# Patient Record
Sex: MALE | Race: BLACK OR AFRICAN AMERICAN | NOT HISPANIC OR LATINO | Employment: FULL TIME | URBAN - METROPOLITAN AREA
[De-identification: names, ages, dates, MRNs, and addresses within clinical notes are randomized per-mention and may not be internally consistent; named-entity substitution may affect disease eponyms.]

---

## 2019-02-22 ENCOUNTER — OFFICE VISIT (OUTPATIENT)
Dept: URGENT CARE | Facility: CLINIC | Age: 29
End: 2019-02-22

## 2019-02-22 ENCOUNTER — OCCUPATIONAL HEALTH (OUTPATIENT)
Dept: URGENT CARE | Facility: CLINIC | Age: 29
End: 2019-02-22

## 2019-02-22 VITALS
TEMPERATURE: 97 F | WEIGHT: 228 LBS | DIASTOLIC BLOOD PRESSURE: 88 MMHG | HEIGHT: 74 IN | RESPIRATION RATE: 16 BRPM | OXYGEN SATURATION: 98 % | HEART RATE: 56 BPM | SYSTOLIC BLOOD PRESSURE: 142 MMHG | BODY MASS INDEX: 29.26 KG/M2

## 2019-02-22 DIAGNOSIS — Z02.83 ENCOUNTER FOR DRUG SCREENING: ICD-10-CM
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
## 2019-02-22 DIAGNOSIS — Z02.1 PRE-EMPLOYMENT EXAMINATION: Primary | ICD-10-CM

## 2019-02-22 DIAGNOSIS — Z11.1 PPD SCREENING TEST: ICD-10-CM

## 2019-02-22 DIAGNOSIS — S51.859A HUMAN BITE OF FOREARM, INITIAL ENCOUNTER: Primary | ICD-10-CM

## 2019-02-22 LAB
BILIRUB UR QL STRIP: NEGATIVE
GLUCOSE UR QL STRIP: NEGATIVE
KETONES UR QL STRIP: NEGATIVE
LEUKOCYTE ESTERASE UR QL STRIP: NEGATIVE
PH, POC UA: 6 (ref 5–8)
POC BLOOD, URINE: NEGATIVE
POC NITRATES, URINE: NEGATIVE
PROT UR QL STRIP: NEGATIVE
SP GR UR STRIP: 1.01 (ref 1–1.03)
UROBILINOGEN UR STRIP-ACNC: NORMAL (ref 0.3–2.2)

## 2019-02-22 PROCEDURE — 99499 UNLISTED E&M SERVICE: CPT | Mod: S$GLB,,, | Performed by: FAMILY MEDICINE

## 2019-02-22 PROCEDURE — 99499 PHYSICAL, BASIC COMPLEXITY: ICD-10-PCS | Mod: S$GLB,,, | Performed by: FAMILY MEDICINE

## 2019-02-22 PROCEDURE — 99204 PR OFFICE/OUTPT VISIT, NEW, LEVL IV, 45-59 MIN: ICD-10-PCS | Mod: S$GLB,,, | Performed by: PHYSICIAN ASSISTANT

## 2019-02-22 PROCEDURE — 80305 OOH NON-DOT DRUG SCREEN: ICD-10-PCS | Mod: S$GLB,,, | Performed by: PHYSICIAN ASSISTANT

## 2019-02-22 PROCEDURE — 99204 OFFICE O/P NEW MOD 45 MIN: CPT | Mod: S$GLB,,, | Performed by: PHYSICIAN ASSISTANT

## 2019-02-22 PROCEDURE — 86580 TB INTRADERMAL TEST: CPT | Mod: S$GLB,,, | Performed by: FAMILY MEDICINE

## 2019-02-22 PROCEDURE — 86580 POCT TB SKIN TEST: ICD-10-PCS | Mod: S$GLB,,, | Performed by: FAMILY MEDICINE

## 2019-02-22 PROCEDURE — 80305 DRUG TEST PRSMV DIR OPT OBS: CPT | Mod: S$GLB,,, | Performed by: PHYSICIAN ASSISTANT

## 2019-02-22 RX ORDER — NAPROXEN 500 MG/1
500 TABLET ORAL 2 TIMES DAILY
COMMUNITY
End: 2021-11-18

## 2019-02-22 RX ORDER — AMOXICILLIN AND CLAVULANATE POTASSIUM 875; 125 MG/1; MG/1
1 TABLET, FILM COATED ORAL 2 TIMES DAILY
Qty: 28 TABLET | Refills: 0 | Status: SHIPPED | OUTPATIENT
Start: 2019-02-22 | End: 2019-03-08

## 2019-02-22 RX ORDER — BUTALBITAL, ACETAMINOPHEN AND CAFFEINE 50; 325; 40 MG/1; MG/1; MG/1
1 TABLET ORAL EVERY 4 HOURS PRN
COMMUNITY
End: 2021-11-18
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
## 2019-02-22 NOTE — PATIENT INSTRUCTIONS
Human Bite  The mouth has bacteria (germs) that can cause a very severe infection. If the tooth of another person has cut your skin, there is a chance of a serious infection developing within the first few days. Bites to the hand are especially prone to infection of the skin (such as cellulitis). Diseases (such as hepatitis B or C, or herpes simplex virus) may also be transmitted through human bites.  Human bite wounds may be either sutured closed or left open to heal depending on location, length of time since the bite, severity, signs of infection, and other concerns. Your doctor may want to do blood tests, a wound culture, X-ray, ultrasound, or others. Your doctor will explain if you need any of these and discuss your results.  Home care  The following will help you care for your wound at home:  1. Most skin wounds heal within 10 days. However, a human bite wound has a higher risk of getting infected. Look at the bite area each day for the next 4 days for signs of infection (listed below).  2. For certain types of wounds, an antibiotic will be prescribed. This will depend on several factors such as severity, surrounding structure injury, depth, location, and others. Take all antibiotics and medicines as directed until they are all gone.  3. If the bite is on the hand, arm, foot, or leg, limit the use of that extremity and keep it elevated for the first 24 hours.  4. You may be given a tetanus shot if needed.  5. Don't suck on the wound. This may introduce more bacteria.  Follow-up care  Follow up with your healthcare provider, or this facility as directed.  When to seek medical advice  Call your healthcare provider right away if you have any of these:  · Spreading redness  · Increased pain or swelling  · Fever of 100.4º F (38º C) or higher, or as directed by your healthcare provider  · Colored fluid or pus draining from the wound  · Any signs of nerve or tendon damage, such as inability to bend a joint or feel  an area of skin  Date Last Reviewed: 6/1/2016  © 7555-5207 [employer]. [address] [place] [address], [place] [address]. All rights reserved. This information is not intended as a substitute for professional medical care. Always follow your healthcare professional's instructions.       If not allergic,take tylenol (acetominophen) for fever control, chills, or body aches every 4 hours. Do not exceed 4000 mg/ day.If not allergic, take Motrin (Ibuprofen) every 4 hours for fever, chills, pain or inflammation. Do not exceed 2400 mg/day. You can alternate taking tylenol and motrin.  If you were prescribed a narcotic medication, do not drive or operate heavy equipment or machinery while taking these medications.  You must understand that you've received an Urgent Care treatment only and that you may be released before all your medical problems are known or treated. You, the patient, will arrange for follow up care as instructed.  Follow up with your PCP or specialty clinic as directed in the next 1-2 weeks if not improved or as needed.  You can call [phone] to schedule an appointment with the appropriate provider.  If your condition worsens we recommend that you receive another evaluation at the emergency room immediately or contact your primary medical clinics after hours call service to discuss your concerns.  Please return here or go to the Emergency Department for any concerns or worsening of condition.

## 2019-02-23 LAB
HBV SURFACE AB SER QL: NON REACTIVE
HCV AB S/CO SERPL IA: <0.1 S/CO RATIO (ref 0–0.9)
HIV 1+2 AB+HIV1 P24 AG SERPL QL IA: NON REACTIVE

## 2019-03-13 ENCOUNTER — TELEPHONE (OUTPATIENT)
Dept: URGENT CARE | Facility: CLINIC | Age: 29
End: 2019-03-13

## 2019-04-16 ENCOUNTER — TELEPHONE (OUTPATIENT)
Dept: URGENT CARE | Facility: CLINIC | Age: 29
End: 2019-04-16

## 2019-09-28 NOTE — TELEPHONE ENCOUNTER
----- Message from Mari Arguelles DO sent at 3/13/2019  8:28 AM CDT -----  Please notify patient that his test results were all normal or negative.  
Notify patient with his normal test result.  
Attending Only

## 2023-05-09 DIAGNOSIS — M25.561 PAIN IN RIGHT KNEE: Primary | ICD-10-CM

## 2023-05-18 ENCOUNTER — CLINICAL SUPPORT (OUTPATIENT)
Dept: REHABILITATION | Facility: HOSPITAL | Age: 33
End: 2023-05-18
Payer: OTHER MISCELLANEOUS

## 2023-05-18 DIAGNOSIS — R29.898 WEAKNESS OF RIGHT LOWER EXTREMITY: ICD-10-CM

## 2023-05-18 DIAGNOSIS — M25.561 PAIN IN RIGHT KNEE: ICD-10-CM

## 2023-05-18 DIAGNOSIS — R68.89 DECREASED FUNCTIONAL ACTIVITY TOLERANCE: ICD-10-CM

## 2023-05-18 PROCEDURE — 97110 THERAPEUTIC EXERCISES: CPT | Mod: PO | Performed by: PHYSICAL THERAPIST

## 2023-05-18 PROCEDURE — 97161 PT EVAL LOW COMPLEX 20 MIN: CPT | Mod: PO | Performed by: PHYSICAL THERAPIST
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
## 2023-05-31 ENCOUNTER — CLINICAL SUPPORT (OUTPATIENT)
Dept: REHABILITATION | Facility: HOSPITAL | Age: 33
End: 2023-05-31
Payer: OTHER MISCELLANEOUS

## 2023-05-31 DIAGNOSIS — R68.89 DECREASED FUNCTIONAL ACTIVITY TOLERANCE: Primary | ICD-10-CM

## 2023-05-31 DIAGNOSIS — R29.898 WEAKNESS OF RIGHT LOWER EXTREMITY: ICD-10-CM

## 2023-05-31 PROCEDURE — 97112 NEUROMUSCULAR REEDUCATION: CPT | Mod: PO,CQ

## 2023-05-31 PROCEDURE — 97110 THERAPEUTIC EXERCISES: CPT | Mod: PO,CQ

## 2023-05-31 NOTE — PROGRESS NOTES
[facility]   Workers' Compensation Physical Therapy Treatment Note      Name: José Miguel Motta  [facility] Number: [id number]    Therapy Diagnosis:   Encounter Diagnoses   Name Primary?    Decreased functional activity tolerance Yes    Weakness of right lower extremity      Physician: Trace Candelaria MD    Visit Date: 5/31/2023    Physician Orders: PT Eval and Treat   Post Surgical? No Eval and Treat Yes Type of Therapy Outpatient Therapy   Medical Diagnosis from Referral: M25.561 (ICD-10-CM) - Pain in right knee   Evaluation Date: 5/18/2023  Authorization Period Expiration: 05/08/2024   Plan of Care Expiration: 7/7/2023  Progress Note Due: 6/17/2023  Visit # / Visits authorized: 2/18  PTA: 1/5    (# of No Show Appts 0/Number of Cancelled Appts 1)    FOTO: 1/5  Time In: 1302  Time Out: 1405  Total Appointment Time: 55 minutes    Precautions: Standard    Prior Medical Treatment: He underwent conservative therapy x 3.5 mo prior to knee and toe surgery. He saw very minimal relief.  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  Current Work Restrictions: not working   Previous work status: Full duty, no knee, back, or foot pain prior to injury  Current work status: not working  Date last worked (if applicable): 11/18/2021    Subjective     Pt reports: his knee is a little sore after working on his ROM over the weekend. Patient states his knee is doing well today but he is feeling sore in the hip. He was compliant with home exercise program.  Response to previous treatment: no adverse effects   Function: too soon to determine    Pain: 4/10  Location: right hip(s) and right knee(s)     Objective      Objective Measures updated  at progress report unless specified.     Treatment     José Miguel received the treatments listed below:      therapeutic exercises to develop strength, endurance, ROM, flexibility, posture, and core stabilization for 40 minutes including:  Upright bike x 5 minutes  Standing gastroc stretch on incline x 1 minute x 2   Quad set + heel lift 2x10, 5 sec hold   SLR 2x10, 2#   S/L hip abduction 2x10, 2# (B)   Supine hip flexor stretch x 1 minute x 3     Lateral walking (green TB at thighs) x 30 ft x 3   BLE shuttle squats 2x10, 100#   SL shuttle squats x 10, 75#     neuromuscular re-education activities to improve: Balance, Coordination, Proprioception, and Posture for 15 minutes. The following activities were included:  Bridge 2x10 (cues for proper gluteal engagement)  Step ups (4 inch) 2x10  Standing TKE (green TB) 2x10    therapeutic activities to improve functional performance for 00  minutes, including:  Not today but will progress next/future treatment sessions    Patient Education and Home Exercises       Education provided:   - HEP compliance     Home Exercises Provided: Patient instructed to cont prior HEP. Exercises were reviewed and José Miguel was able to demonstrate them prior to the end of the session.  José Miguel demonstrated good  understanding of the education provided. See EMR under Patient Instructions for exercises provided during therapy sessions    Assessment     José Miguel demonstrating good quad activation but weakness is noted with SLR. Patient challenged by progression of gluteal focused exercises especially hip abductors. Cues for lateral gluteal recruitment as medial knee pain is noted with lateral walking. Good tolerance to step ups and standing TKE without complaints of pain but cues provided for proper LE alignment.     The patient's current job specific task deficits include the following: impaired standing tolerance and mobility.     José Miguel Is is making good progress towards meeting his goals.      Patient prognosis is: Good.   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    Pt's spiritual, cultural and educational needs considered and pt agreeable to plan of care and goals.     Anticipated barriers to physical therapy: chronicity of current injury, objective abilities vs. self-perceived abilities, and self-limiting behaviors due to pain    Goals:   Short Term Goals: 4 weeks   (1) patient will be independent with HOME EXERCISE PROGRAM. (initial, not met)      (2) patient will demo right knee AROM flexion to 135 deg for symmetry. (initial, not met)      (3) patient will demo bilateral hip and knee strength 5/5 for RTW. 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 (initial, not met)      (2) patient will demo FOTO impairment score 43% for improved QOL. 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 (initial, not met)    Plan     Continue current POC with emphasis on improving functional strength and ROM.     Keeley Terrell, PTA   5/31/2023

## 2023-06-02 ENCOUNTER — DOCUMENTATION ONLY (OUTPATIENT)
Dept: REHABILITATION | Facility: HOSPITAL | Age: 33
End: 2023-06-02

## 2023-06-05 ENCOUNTER — CLINICAL SUPPORT (OUTPATIENT)
Dept: REHABILITATION | Facility: HOSPITAL | Age: 33
End: 2023-06-05
Payer: OTHER MISCELLANEOUS

## 2023-06-05 DIAGNOSIS — R68.89 DECREASED FUNCTIONAL ACTIVITY TOLERANCE: Primary | ICD-10-CM

## 2023-06-05 DIAGNOSIS — R29.898 WEAKNESS OF RIGHT LOWER EXTREMITY: ICD-10-CM

## 2023-06-05 PROCEDURE — 97112 NEUROMUSCULAR REEDUCATION: CPT | Mod: PO | Performed by: PHYSICAL THERAPIST

## 2023-06-05 PROCEDURE — 97140 MANUAL THERAPY 1/> REGIONS: CPT | Mod: PO | Performed by: PHYSICAL THERAPIST

## 2023-06-05 PROCEDURE — 97110 THERAPEUTIC EXERCISES: CPT | Mod: PO | Performed by: PHYSICAL THERAPIST

## 2023-06-05 NOTE — PROGRESS NOTES
[facility]   Workers' Compensation Physical Therapy Treatment Note      Name: José Miguel Motta  Clinic Number: [id number]    Therapy Diagnosis:   Encounter Diagnoses   Name Primary?    Decreased functional activity tolerance Yes    Weakness of right lower extremity      Physician: Trace Candelaria MD    Visit Date: 6/5/2023    Physician Orders: PT Eval and Treat   Post Surgical? No Eval and Treat Yes Type of Therapy Outpatient Therapy   Medical Diagnosis from Referral: M25.561 (ICD-10-CM) - Pain in right knee   Evaluation Date: 5/18/2023  Authorization Period Expiration: 05/08/2024   Plan of Care Expiration: 7/7/2023  Progress Note Due: 6/17/2023  Visit # / Visits authorized: 3/18  PTA: 0/5    (# of No Show Appts 0/Number of Cancelled Appts 1)    FOTO: 1/5  Time In: 1306  Time Out: 1400  Total Appointment Time: 55 minutes    Precautions: Standard    Prior Medical Treatment: He underwent conservative therapy x 3.5 mo prior to knee and toe surgery. He saw very minimal relief.  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 His pain today is a 3/10. He performed home program last on Saturday.  He was compliant with home exercise program.  Response to previous treatment: no adverse effects   Function: too soon to determine    Pain: 3/10  Location: right hip(s) and right knee(s)     Objective      Objective Measures updated at progress report unless  "specified.     Treatment     José Miguel received the treatments listed below:      therapeutic exercises to develop strength, endurance, ROM, flexibility, posture, and core stabilization for 35 minutes including:  Upright bike x 5 minutes  Standing gastroc stretch on incline x 1 minute x 2 (Gastroc and Soleus)  Quad set + heel lift 2x10, 5 sec hold   SLR 2x10, 2#   S/L hip abduction 2x10, 2# (B) (cues given for tech)  Supine hip flexor stretch x 1 minute x 3 -np  Right knee AROM flexion: 135 deg (Dec pain after MT)  QS with internal tibial rot and self OP 10 x 3"  Self inf mobs x 5 (+ to HEP x 10 each day)  Right LE strap up 6" with Left knee drive x 10    Not performed:  Lateral walking (green TB at thighs) x 30 ft x 3   BLE shuttle squats 2x10, 100#   SL shuttle squats x 10, 75#     Manual therapy x 10 mins:  Fat pad mobs  Tibial INTERNAL ROTATION mobs  Inferior patellar mobs grade III-IV    neuromuscular re-education activities to improve: Balance, Coordination, Proprioception, and Posture for 15 minutes. The following activities were included:  Bridge 2x10 (cues for proper gluteal engagement)  Step ups (4 inch) 2 x 10  Heel tap 2" x 10 (attempted and discontinued)  Standing TKE (green TB) 2 x 15    therapeutic activities to improve functional performance for 00  minutes, including:  Not today but will progress next/future treatment sessions    Patient Education and Home Exercises       Education provided:   - HEP compliance   - + inf patella self mobs x 10 a day    Home Exercises Provided: Patient instructed to cont prior HEP. Exercises were reviewed and José Miguel was able to demonstrate them prior to the end of the session.  José Miguel demonstrated good  understanding of the education provided. See EMR under Patient Instructions for exercises provided during therapy sessions    Assessment     José Miguel demonstrating good quad activation and improved post right knee mobs as above. Patient challenged by progression of " "gluteal focused exercises especially hip abductors. Pt reported pn to be much less following manual therapy and home education. Pt with tendency to kick in right TFL and hip flexors during side lying hip Abduction. Pt able to correct quickly with cues. May use a ball with lateral walks to see if this decreases knee pn. Pt with med knee pain during attempted progression to heel tap on 2". Continue improving function and mobility.    The patient's current job specific task deficits include the following: impaired standing tolerance and mobility.     José Miguel Luz is making good progress towards meeting his goals.     Patient prognosis is: Good.   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    Pt's spiritual, cultural and educational needs considered and pt agreeable to plan of care and goals.     Anticipated barriers to physical therapy: chronicity of current injury, objective abilities vs. self-perceived abilities, and self-limiting behaviors due to pain    Goals:   Short Term Goals: 4 weeks   (1) patient will be independent with HOME EXERCISE PROGRAM. (initial, not met)    -ongoing     (2) patient will demo right knee AROM flexion to 135 deg for symmetry. (initial, not met)      (3) patient will demo bilateral hip and knee strength 5/5 for RTW. 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 (initial, not met)      (2) patient will demo FOTO impairment score 43% for improved QOL. 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 (initial, not met)    Plan     Continue current POC with emphasis on " improving functional strength and ROM.     Edie Lerma, PT   6/5/2023

## 2023-06-07 ENCOUNTER — CLINICAL SUPPORT (OUTPATIENT)
Dept: REHABILITATION | Facility: HOSPITAL | Age: 33
End: 2023-06-07
Payer: OTHER MISCELLANEOUS

## 2023-06-07 DIAGNOSIS — R29.898 WEAKNESS OF RIGHT LOWER EXTREMITY: ICD-10-CM

## 2023-06-07 DIAGNOSIS — R68.89 DECREASED FUNCTIONAL ACTIVITY TOLERANCE: Primary | ICD-10-CM

## 2023-06-07 PROCEDURE — 97110 THERAPEUTIC EXERCISES: CPT | Mod: PO,CQ

## 2023-06-07 PROCEDURE — 97140 MANUAL THERAPY 1/> REGIONS: CPT | Mod: PO,CQ

## 2023-06-07 PROCEDURE — 97112 NEUROMUSCULAR REEDUCATION: CPT | Mod: PO,CQ

## 2023-06-07 NOTE — PROGRESS NOTES
[facility]   Workers' Compensation Physical Therapy Treatment Note      Name: José Miguel Motta  Clinic Number: [id number]    Therapy Diagnosis:   Encounter Diagnoses   Name Primary?    Decreased functional activity tolerance Yes    Weakness of right lower extremity      Physician: Trace Candelaria MD    Visit Date: 6/7/2023    Physician Orders: PT Eval and Treat   Post Surgical? No Eval and Treat Yes Type of Therapy Outpatient Therapy   Medical Diagnosis from Referral: M25.561 (ICD-10-CM) - Pain in right knee   Evaluation Date: 5/18/2023  Authorization Period Expiration: 05/08/2024   Plan of Care Expiration: 7/7/2023  Progress Note Due: 6/17/2023  Visit # / Visits authorized: 4/18  PTA: 0/5    (# of No Show Appts 0/Number of Cancelled Appts 1)    FOTO: 1/5    Time In: 1305  Time Out: 1400  Total Appointment Time: 55 minutes    Precautions: Standard    Prior Medical Treatment: He underwent conservative therapy x 3.5 mo prior to knee and toe surgery. He saw very minimal relief.  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 His pain today is a 3/10. He performed home program last on Saturday.  He was compliant with home exercise program.  Response to previous treatment: no adverse effects   Function: too soon to determine    Pain: 3/10  Location: right hip(s) and right knee(s)     Objective      Objective Measures updated at progress report unless  "specified.     Treatment     José Miguel received the treatments listed below:      therapeutic exercises to develop strength, endurance, ROM, flexibility, posture, and core stabilization for 35 minutes including:  Upright bike x 5 minutes  Standing gastroc stretch on incline x 1 minute x 2 (Gastroc and Soleus)  Quad set + heel lift 2x10, 5 sec hold   SLR 2x10, 2#   S/L hip abduction 2x10, 2# (B) (cues given for tech)  Supine hip flexor stretch x 1 minute x 3 -np    BLE shuttle squats 3x10, 100#  SL shuttle squats 2x10, 62.5#     Lateral walking (green TB at thighs) x 30 ft x 3 (straight knees, no mini squat due to pain)    Manual therapy x 10 mins:  Fat pad mobs  Tibial INTERNAL ROTATION mobs  Inferior patellar mobs grade III-IV    neuromuscular re-education activities to improve: Balance, Coordination, Proprioception, and Posture for 15 minutes. The following activities were included:  Bridge 2x10 (cues for proper gluteal engagement)  Standing TKE (ball against wall) 2x10, 3 sec hold   Right LE step up 6" with Left knee drive x 10  Heel tap 2" x 10 (attempted and discontinued)    therapeutic activities to improve functional performance for 00  minutes, including:  Not today but will progress next/future treatment sessions    Patient Education and Home Exercises       Education provided:   - HEP compliance   - + inf patella self mobs x 10 a day    Home Exercises Provided: Patient instructed to cont prior HEP. Exercises were reviewed and José Miguel was able to demonstrate them prior to the end of the session.  José Miguel demonstrated good  understanding of the education provided. See EMR under Patient Instructions for exercises provided during therapy sessions    Assessment     José Miguel demonstrating improved awareness of isolated quad activation without compensation. Good tolerance to shuttle squats today demonstrating ability to appropriately activate hip abductors. No complaints of pain Patient responded well to manual " therapy techniques to improve end range extension and overall joint mechanics. Continue improving function and mobility.    The patient's current job specific task deficits include the following: impaired standing tolerance and mobility.     José Miguel Luz is making good progress towards meeting his goals.     Patient prognosis is: Good.   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    Pt's spiritual, cultural and educational needs considered and pt agreeable to plan of care and goals.     Anticipated barriers to physical therapy: chronicity of current injury, objective abilities vs. self-perceived abilities, and self-limiting behaviors due to pain    Goals:   Short Term Goals: 4 weeks   (1) patient will be independent with HOME EXERCISE PROGRAM. (initial, not met)    -ongoing     (2) patient will demo right knee AROM flexion to 135 deg for symmetry. (initial, not met)      (3) patient will demo bilateral hip and knee strength 5/5 for RTW. 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 (initial, not met)      (2) patient will demo FOTO impairment score 43% for improved QOL. 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 (initial, not met)    Plan     Continue current POC with emphasis on improving functional strength and ROM.     Keeley Terrell, PTA   6/7/2023

## 2023-06-12 ENCOUNTER — CLINICAL SUPPORT (OUTPATIENT)
Dept: REHABILITATION | Facility: HOSPITAL | Age: 33
End: 2023-06-12
Payer: OTHER MISCELLANEOUS

## 2023-06-12 DIAGNOSIS — R29.898 WEAKNESS OF RIGHT LOWER EXTREMITY: ICD-10-CM

## 2023-06-12 DIAGNOSIS — R68.89 DECREASED FUNCTIONAL ACTIVITY TOLERANCE: Primary | ICD-10-CM

## 2023-06-12 PROCEDURE — 97112 NEUROMUSCULAR REEDUCATION: CPT | Mod: PO,CQ

## 2023-06-12 PROCEDURE — 97110 THERAPEUTIC EXERCISES: CPT | Mod: PO,CQ

## 2023-06-12 NOTE — PROGRESS NOTES
[facility]   Workers' Compensation Physical Therapy Treatment Note      Name: José Miguel Motta  Clinic Number: [id number]    Therapy Diagnosis:   Encounter Diagnoses   Name Primary?    Decreased functional activity tolerance Yes    Weakness of right lower extremity      Physician: Trace Candelaria MD    Visit Date: 6/12/2023    Physician Orders: PT Eval and Treat   Post Surgical? No Eval and Treat Yes Type of Therapy Outpatient Therapy   Medical Diagnosis from Referral: M25.561 (ICD-10-CM) - Pain in right knee   Evaluation Date: 5/18/2023  Authorization Period Expiration: 05/08/2024   Plan of Care Expiration: 7/7/2023  Progress Note Due: 6/17/2023  Visit # / Visits authorized: 5/18  PTA: 1/5    (# of No Show Appts 0/Number of Cancelled Appts 1)    FOTO: 1/5    Time In: 1305  Time Out: 1405  Total Appointment Time: 40 minutes    Precautions: Standard    Prior Medical Treatment: He underwent conservative therapy x 3.5 mo prior to knee and toe surgery. He saw very minimal relief.  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 His pain today is a 3/10. He performed home program last on Saturday.  He was compliant with home exercise program.  Response to previous treatment: no adverse effects   Function: too soon to determine    Pain: 3/10  Location: right hip(s) and right knee(s)     Objective      Objective Measures updated at progress report unless  "specified.     Treatment     José Miguel received the treatments listed below:      therapeutic exercises to develop strength, endurance, ROM, flexibility, posture, and core stabilization for 35 minutes including:  Upright bike x 5 minutes  Standing gastroc stretch on incline x 1 minute x 2 (Gastroc and Soleus)  Quad set + heel lift 2x10, 5 sec hold   SLR 2x10, 2#   S/L hip abduction 2x10, 2# (B) (cues given for tech)  Supine hip flexor stretch x 1 minute x 3 -np  Seated precor: knee extension eccentric: 2 up, 1 down (R), 2x10, 15#   BLE shuttle squats 3x10, 100#  SL shuttle squats 2x10, 62.5#   Lateral walking (green TB at thighs) x 30 ft x 3 (straight knees, no mini squat due to pain)    Manual therapy x 00 mins:  Fat pad mobs  Tibial INTERNAL ROTATION mobs  Inferior patellar mobs grade III-IV    neuromuscular re-education activities to improve: Balance, Coordination, Proprioception, and Posture for 15 minutes. The following activities were included:  Bridge 2x10 (cues for proper gluteal engagement)  Standing TKE (ball against wall) 2x10, 3 sec hold   Right LE step up 6" with Left knee drive x 10  Heel tap 2" x 10 (attempted and discontinued)    therapeutic activities to improve functional performance for 00  minutes, including:  Not today but will progress next/future treatment sessions    Patient Education and Home Exercises       Education provided:   - HEP compliance   - + inf patella self mobs x 10 a day    Home Exercises Provided: Patient instructed to cont prior HEP. Exercises were reviewed and José Miguel was able to demonstrate them prior to the end of the session.  José Miguel demonstrated good  understanding of the education provided. See EMR under Patient Instructions for exercises provided during therapy sessions    Assessment     Progressed eccentric focused activities today without adverse effect but weakness evident. Patient continues with medial knee pain with lateral walking that resolves when he is taken " out of mini squat position. Quad strength consistently progressing.     The patient's current job specific task deficits include the following: impaired standing tolerance and mobility.     José Miguel Luz is making good progress towards meeting his goals.     Patient prognosis is: Good.   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    Pt's spiritual, cultural and educational needs considered and pt agreeable to plan of care and goals.     Anticipated barriers to physical therapy: chronicity of current injury, objective abilities vs. self-perceived abilities, and self-limiting behaviors due to pain    Goals:   Short Term Goals: 4 weeks   (1) patient will be independent with HOME EXERCISE PROGRAM. (initial, not met)    -ongoing     (2) patient will demo right knee AROM flexion to 135 deg for symmetry. (initial, not met)      (3) patient will demo bilateral hip and knee strength 5/5 for RTW. 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 (initial, not met)      (2) patient will demo FOTO impairment score 43% for improved QOL. 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 (initial, not met)    Plan     Continue current POC with emphasis on improving functional strength and ROM.     Keeley Terrell, PTA   6/12/2023

## 2023-06-14 ENCOUNTER — CLINICAL SUPPORT (OUTPATIENT)
Dept: REHABILITATION | Facility: HOSPITAL | Age: 33
End: 2023-06-14
Payer: OTHER MISCELLANEOUS

## 2023-06-14 DIAGNOSIS — R29.898 WEAKNESS OF RIGHT LOWER EXTREMITY: ICD-10-CM

## 2023-06-14 DIAGNOSIS — R68.89 DECREASED FUNCTIONAL ACTIVITY TOLERANCE: Primary | ICD-10-CM

## 2023-06-14 PROCEDURE — 97530 THERAPEUTIC ACTIVITIES: CPT | Mod: PO,CQ

## 2023-06-14 PROCEDURE — 97110 THERAPEUTIC EXERCISES: CPT | Mod: PO,CQ

## 2023-06-14 PROCEDURE — 97112 NEUROMUSCULAR REEDUCATION: CPT | Mod: PO,CQ

## 2023-06-14 NOTE — PROGRESS NOTES
[facility]   Workers' Compensation Physical Therapy Treatment Note      Name: José Miguel Motta  [facility] Number: [id number]    Therapy Diagnosis:   Encounter Diagnoses   Name Primary?    Decreased functional activity tolerance Yes    Weakness of right lower extremity        Physician: Trace Candelaria MD    Visit Date: 6/14/2023    Physician Orders: PT Eval and Treat   Post Surgical? No Eval and Treat Yes Type of Therapy Outpatient Therapy   Medical Diagnosis from Referral: M25.561 (ICD-10-CM) - Pain in right knee   Evaluation Date: 5/18/2023  Authorization Period Expiration: 05/08/2024   Plan of Care Expiration: 7/7/2023  Progress Note Due: 6/17/2023  Visit # / Visits authorized: 6/18  PTA: 2/5    (# of No Show Appts 0/Number of Cancelled Appts 1)    FOTO: 1/5    Time In: 1304  Time Out: 1400  Total Appointment Time: 53 minutes    Precautions: Standard    Prior Medical Treatment: He underwent conservative therapy x 3.5 mo prior to knee and toe surgery. He saw very minimal relief.  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 Patient reports soreness into thighs and knee following last treatment session. Patient states he does have intermittent complaints of knee soreness throughout the day but he works through this. He was compliant with home exercise program.  Response to previous treatment: no adverse effects   Function: too soon to determine    Pain: 3/10  Location:  right hip(s) and right knee(s)     Objective      Objective Measures updated at progress report unless specified.     Treatment     José Miguel received the treatments listed below:      therapeutic exercises to develop strength, endurance, ROM, flexibility, posture, and core stabilization for 35 minutes including:  Upright bike x 7 minutes, patient sets resistance  Standing gastroc stretch on incline x 1 minute x 2 (Gastroc and Soleus)  Quad set + heel lift 2x10, 5 sec hold   SLR 3x10, 2#   S/L hip abduction 3x10, 2# (B)   Seated precor: knee extension eccentric: 2 up, 1 down (R), 2x10, 20#   BLE shuttle squats 3x10, 100#  SL shuttle squats 2x10, 62.5#   Lateral walking (green TB at thighs) x 30 ft x 3 (straight knees, no mini squat due to pain)    Manual therapy x 00 mins:  Fat pad mobs  Tibial INTERNAL ROTATION mobs  Inferior patellar mobs grade III-IV    neuromuscular re-education activities to improve: Balance, Coordination, Proprioception, and Posture for 15 minutes. The following activities were included:  Bridge 2x10 (green TB, cues for proper gluteal engagement)  Standing TKE (ball against wall) 2x10, 3 sec hold     therapeutic activities to improve functional performance for 10 minutes, including:  Sit to stand from standard chair to mimic squat 2x10   Sit to stand from standard chair + 10# med ball 2x10  Step up (12 inch step) + knee drive    Patient Education and Home Exercises       Education provided:   - HEP compliance   - + inf patella self mobs x 10 a day    Home Exercises Provided: Patient instructed to cont prior HEP. Exercises were reviewed and José Miguel was able to demonstrate them prior to the end of the session.  José Miguel demonstrated good  understanding of the education provided. See EMR under Patient Instructions for exercises provided during therapy sessions    Assessment     José Miguel able to progress to squatting activities without complaints of pain but visual feedback from mirror required to  achieve proper form and weightbearing. Patient fatigued by progression of activities today and he is anxious to return to normal activities/workout routine    The patient's current job specific task deficits include the following: impaired standing tolerance and mobility.     José Miguel Luz is making good progress towards meeting his goals.     Patient prognosis is: Good.   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    Pt's spiritual, cultural and educational needs considered and pt agreeable to plan of care and goals.     Anticipated barriers to physical therapy: chronicity of current injury, objective abilities vs. self-perceived abilities, and self-limiting behaviors due to pain    Goals:   Short Term Goals: 4 weeks   (1) patient will be independent with HOME EXERCISE PROGRAM. (initial, not met)    -ongoing     (2) patient will demo right knee AROM flexion to 135 deg for symmetry. (initial, not met)      (3) patient will demo bilateral hip and knee strength 5/5 for RTW. 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 (initial, not met)      (2) patient will demo FOTO impairment score 43% for improved QOL. 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 (initial, not met)    Plan     Continue current POC with emphasis on improving functional strength and ROM.     Keeley Terrell, PTA   6/14/2023

## 2023-06-16 ENCOUNTER — CLINICAL SUPPORT (OUTPATIENT)
Dept: REHABILITATION | Facility: HOSPITAL | Age: 33
End: 2023-06-16
Payer: OTHER MISCELLANEOUS

## 2023-06-16 DIAGNOSIS — R68.89 DECREASED FUNCTIONAL ACTIVITY TOLERANCE: Primary | ICD-10-CM

## 2023-06-16 DIAGNOSIS — R29.898 WEAKNESS OF RIGHT LOWER EXTREMITY: ICD-10-CM

## 2023-06-16 PROCEDURE — 97140 MANUAL THERAPY 1/> REGIONS: CPT | Mod: PO | Performed by: PHYSICAL THERAPIST

## 2023-06-16 PROCEDURE — 97110 THERAPEUTIC EXERCISES: CPT | Mod: PO | Performed by: PHYSICAL THERAPIST

## 2023-06-16 PROCEDURE — 97530 THERAPEUTIC ACTIVITIES: CPT | Mod: PO | Performed by: PHYSICAL THERAPIST

## 2023-06-16 NOTE — PROGRESS NOTES
"[facility]   Reassessment: Workers' Compensation Physical Therapy Treatment Note      Name: José Miguel Motta  Clinic Number: [id number]    Therapy Diagnosis:   Encounter Diagnoses   Name Primary?    Decreased functional activity tolerance Yes    Weakness of right lower extremity      Physician: Trace Candelaria MD    Visit Date: 6/16/2023    Physician Orders: PT Eval and Treat   Post Surgical? No Eval and Treat Yes Type of Therapy Outpatient Therapy   Medical Diagnosis from Referral: M25.561 (ICD-10-CM) - Pain in right knee   Evaluation Date: 5/18/2023  Authorization Period Expiration: 05/08/2024   Plan of Care Expiration: 7/7/2023  Progress Note Due: 6/17/2023  Visit # / Visits authorized: 7/18  PTA: 0/5    (# of No Show Appts 0/Number of Cancelled Appts 1)    FOTO: 2/5    Time In: 900 (in restroom)  Time Out: 1005  Total Appointment Time: 54 minutes    Precautions: Standard    Prior Medical Treatment: He underwent conservative therapy x 3.5 mo prior to knee and toe surgery. He saw very minimal relief.  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 Right knee at times is "shooting" pain. Seeshira Talavera end of month He was compliant with home exercise program.  Response to previous treatment: no adverse effects   Function: too soon to determine    Pain: 3/10 knee; 4/10 hip  Location: right hip(s) and right knee(s)     His center back hurts.    Objective      Objective Measures updated at " progress report unless specified.   Observations: Antalgic gait pattern is not present. However, decreased right knee TKE at heel strike is present. (QUAD WEAKNESS LIKELY GIVEN FULL KNEE EXTENSION)  Palpation: pt tenderness to palpation to med and lat joint line. Swelling present? yes     Knee Right     Left     Comment as applicable    (Normal, in degrees) AROM PROM MMT AROM PROM MMT     Flexion (135)  137 + 4-/5 135 - 5/5 + pn w/ over pressure flex   Extension (0-5) 0 - 4/5 0 - 5/5        Ankle strength: bilateral grossly 5/5.     Hip strength:  Right/Left:  Hip flexion: 4+/5; 5/5  Hip Abduction: 4+/5; 5/5  Hip adduction: 4/5; 5/5     Muscle length ((+): Positive, (-): Negative):   - Hamstring 90/90 = (+)  - ITB Geoffrey's testing = (+) (lateral patella tilt bilateral knee)     Ligamentous Special testing ((+): Positive, (-): Negative):   - MCL: Valgus stress test = (-)  - LCL: Varus stress test = (-)  - ACL: Anterior drawer = (-), Lachman's = (-)  - PCL: tibial plateau sag sign = (-), Posterior Drawer = (-)     Meniscus testing: Cluster exam for meniscal injury: Met 2/5  - (neg) Hx joint locking, (+) Joint line tenderness, (-) Paul, (+) Pain with overpressure flexion, (-) Pain with overpressure extension:      Cluster exam for knee OA: Cluster exam for knee OA: Met 1/5  - (neg) Age > 49 y/o, stiffness > 30', (neg) crepitus, (neg) bony tenderness, (pos) no palpable warmth     Functional Screening: Pain with the following? 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 Mobility skills Sweeping, walking 30 mins around home    3. N/A          Limitation/Restriction for  "FOTO KNEE Survey     Therapist reviewed FOTO scores   FOTO documents entered into [facility] - see Media section.     Limitation Score: 59%  Predicted Score: 43%  6/16/2023: 54%      Treatment     José Miguel received the treatments listed below:      therapeutic exercises to develop strength, endurance, ROM, flexibility, posture, and core stabilization for 42 minutes including:  Upright bike x 7 minutes, patient sets resistance-np d/t late start  Standing gastroc stretch on incline x 1 minute x 2 (Gastroc and Soleus)-np  Quad set + heel lift 2x10, 5 sec hold   Side lying hip adduction 3 # 2 x 10, 3#  SLR 3x10, 3#   S/L hip abduction 3x10, 3# (B)   SINGLE LEG bridge 2 x 10, 3"  Reassessment.    Not performed:  Seated precor: knee extension eccentric: 2 up, 1 down (R), 2x10, 20#   BLE shuttle squats 3x10, 100#  SL shuttle squats 2x10, 62.5#   Lateral walking (green TB at thighs) x 30 ft x 3 (straight knees, no mini squat due to pain)    Manual therapy x 08 mins:  Fat pad mobs  Scar massage  Inferior patellar mobs grade III-IV    neuromuscular re-education activities to improve: Balance, Coordination, Proprioception, and Posture for 00 minutes. The following activities were included:  Bridge 2x10 (green TB, cues for proper gluteal engagement)  Standing TKE (ball against wall) 2x10, 3 sec hold     therapeutic activities to improve functional performance for 10 minutes, including:  Chair squats ball at knees x 10  Lat walk with ball 20 feet x 3 laps    Not performed:  Sit to stand from standard chair to mimic squat 2x10   Sit to stand from standard chair + 10# med ball 2x10  Step up (12 inch step) + knee drive    Patient Education and Home Exercises       Education provided:   - HEP compliance   - knuckle scar massage in sitting to knee    Home Exercises Provided: Patient instructed to cont prior HEP. Exercises were reviewed and José Miguel was able to demonstrate them prior to the end of the session.  José Miguel demonstrated good  " understanding of the education provided. See EMR under Patient Instructions for exercises provided during therapy sessions    Assessment     José Miguel comes in comfortable sliders. He was instructed to come in tennis shoes as we will be performing more squatting and things towards end goal of returning to some sort of working condition. He has scar tissue, reduce fat pad mobility, and swelling inf right patella causing him pain. Manual therapy performed to reduce this. Weakness in right hip adduction, added side lying adduction and lat walks with ball. Improving strength of right hip Abduction but still weak. He is able to progress to squatting activities without complaints of pain but visual feedback from mirror required to achieve proper form and weightbearing. Ball for VMO and adductors used with chair squats. Patient fatigued by progression of activities today, and he is anxious to return to normal activities/workout routine.    The patient's current job specific task deficits include the following: impaired standing tolerance and mobility.     José Miguel is making good progress towards meeting his goals.     Patient prognosis is: Good.   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    Pt's spiritual, cultural and educational needs considered and pt agreeable to plan of care and goals.     Anticipated barriers to physical therapy: chronicity of current injury, objective abilities vs. self-perceived abilities, and self-limiting behaviors due to pain    Goals:   Short Term Goals: 4 weeks   (1) patient will be independent with HOME EXERCISE PROGRAM.   -MET, 6/16/2023.     (2) patient will demo right knee AROM  flexion to 135 deg for symmetry.    -MET, 6/16/2023, 137 DEG.     (3) patient will demo bilateral hip and knee strength 5/5 for RTW. 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 (progressing)      (2) patient will demo FOTO impairment score 43% for improved QOL. 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 (progressing, not met)    Plan     Continue current POC with emphasis on improving functional strength and ROM.     Edie Lerma, PT   6/16/2023

## 2023-06-19 ENCOUNTER — CLINICAL SUPPORT (OUTPATIENT)
Dept: REHABILITATION | Facility: HOSPITAL | Age: 33
End: 2023-06-19
Payer: OTHER MISCELLANEOUS

## 2023-06-19 DIAGNOSIS — R29.898 WEAKNESS OF RIGHT LOWER EXTREMITY: ICD-10-CM

## 2023-06-19 DIAGNOSIS — R68.89 DECREASED FUNCTIONAL ACTIVITY TOLERANCE: Primary | ICD-10-CM

## 2023-06-19 PROCEDURE — 97530 THERAPEUTIC ACTIVITIES: CPT | Mod: PO

## 2023-06-19 PROCEDURE — 97110 THERAPEUTIC EXERCISES: CPT | Mod: PO

## 2023-06-19 PROCEDURE — 97140 MANUAL THERAPY 1/> REGIONS: CPT | Mod: PO

## 2023-06-19 PROCEDURE — 97112 NEUROMUSCULAR REEDUCATION: CPT | Mod: PO

## 2023-06-19 NOTE — PROGRESS NOTES
[facility]   Reassessment: Workers' Compensation Physical Therapy Treatment Note      Name: José Miguel Motta  [facility] Number: [id number]    Therapy Diagnosis:   Encounter Diagnoses   Name Primary?    Decreased functional activity tolerance Yes    Weakness of right lower extremity      Physician: Trace Candelaria MD    Visit Date: 6/19/2023    Physician Orders: PT Eval and Treat   Post Surgical? No Eval and Treat Yes Type of Therapy Outpatient Therapy   Medical Diagnosis from Referral: M25.561 (ICD-10-CM) - Pain in right knee   Evaluation Date: 5/18/2023  Authorization Period Expiration: 05/08/2024   Plan of Care Expiration: 7/7/2023  Progress Note Due: 6/17/2023  Visit # / Visits authorized: 7/18  PTA: 0/5    (# of No Show Appts 0/Number of Cancelled Appts 1)    FOTO: 2/5    Time In: 1100  Time Out: 1200  Total Appointment Time: 60 minutes    Precautions: Standard    Prior Medical Treatment: He underwent conservative therapy x 3.5 mo prior to knee and toe surgery. He saw very minimal relief.  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  Response to previous treatment: no adverse effects   Function: too soon to determine    Pain: 3/10 knee; 4/10 hip  Location: right hip(s) and right knee(s)     His center back hurts.    Objective      Objective Measures updated at progress report unless specified.   Observations:  Antalgic gait pattern is not present. However, decreased right knee TKE at heel strike is present. (QUAD WEAKNESS LIKELY GIVEN FULL KNEE EXTENSION)  Palpation: pt tenderness to palpation to med and lat joint line. Swelling present? yes     Knee Right     Left     Comment as applicable    (Normal, in degrees) AROM PROM MMT AROM PROM MMT     Flexion (135)  137 + 4-/5 135 - 5/5 + pn w/ over pressure flex   Extension (0-5) 0 - 4/5 0 - 5/5        Ankle strength: bilateral grossly 5/5.     Hip strength:  Right/Left:  Hip flexion: 4+/5; 5/5  Hip Abduction: 4+/5; 5/5  Hip adduction: 4/5; 5/5     Muscle length ((+): Positive, (-): Negative):   - Hamstring 90/90 = (+)  - ITB Geoffrey's testing = (+) (lateral patella tilt bilateral knee)     Ligamentous Special testing ((+): Positive, (-): Negative):   - MCL: Valgus stress test = (-)  - LCL: Varus stress test = (-)  - ACL: Anterior drawer = (-), Lachman's = (-)  - PCL: tibial plateau sag sign = (-), Posterior Drawer = (-)     Meniscus testing: Cluster exam for meniscal injury: Met 2/5  - (neg) Hx joint locking, (+) Joint line tenderness, (-) Paul, (+) Pain with overpressure flexion, (-) Pain with overpressure extension:      Cluster exam for knee OA: Cluster exam for knee OA: Met 1/5  - (neg) Age > 49 y/o, stiffness > 30', (neg) crepitus, (neg) bony tenderness, (pos) no palpable warmth     Functional Screening: Pain with the following? (Y = yes, N = no), Comments if applicable  - Squatting: Y  - Lunging: Y  - Kneeling: Y  - Walking on heels: N  - Walking on toes: Y, right toe  - Single leg Balance: N  - Thessaly Meniscal Test: N     [place] Knee Rules: X-Ray Indication (neg)     Functional Job Specific Testing: [employer]  Job Specific Task Job Demands Current Ability   1. 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 Mobility skills Sweeping, walking 30 mins around home    3. N/A          Limitation/Restriction for FOTO KNEE Survey     Therapist reviewed FOTO scores  "  FOTO documents entered into [facility] - see Media section.     Limitation Score: 59%  Predicted Score: 43%  6/16/2023: 54%      Treatment     José Miguel received the treatments listed below:      therapeutic exercises to develop strength, endurance, ROM, flexibility, posture, and core stabilization for 15 minutes including:  Upright bike x 7 minutes, patient sets resistance-np d/t late start  Standing gastroc stretch on incline x 1 minute x 2 (Gastroc and Soleus)-np  Quad set + heel lift 2x10, 5 sec hold       Not performed:  Side lying hip adduction 3 # 2 x 10, 3#  SLR 3x10, 3#   S/L hip abduction 3x10, 3# (B)   SINGLE LEG bridge 2 x 10, 3"  Reassessment.  Seated precor: knee extension eccentric: 2 up, 1 down (R), 2x10, 20#   BLE shuttle squats 3x10, 100#  SL shuttle squats 2x10, 62.5#   Lateral walking (green TB at thighs) x 30 ft x 3 (straight knees, no mini squat due to pain)    Manual therapy x 10 mins:  Fat pad mobs  Scar massage  Inferior patellar mobs grade III-IV    neuromuscular re-education activities to improve: Balance, Coordination, Proprioception, and Posture for 20 minutes. The following activities were included:  Bridge 2x10 (green TB, cues for proper gluteal engagement)  Standing TKE (ball against wall) 2x10, 3 sec hold   Clams with manual resistance 4x15s bilateral   Quad Iso on knee ext machine 5x15s      therapeutic activities to improve functional performance for 15 minutes, including:  Sit to stand from standard chair + 15# med ball 3x8  Step up (12 inch step) + knee drive 20# KB 3x8    Not performed:  Lat walk with ball 20 feet x 3 laps      Patient Education and Home Exercises       Education provided:   - HEP compliance   - knuckle scar massage in sitting to knee    Home Exercises Provided: Patient instructed to cont prior HEP. Exercises were reviewed and José Miguel was able to demonstrate them prior to the end of the session.  José Miguel demonstrated good  understanding of the education provided. " See EMR under Patient Instructions for exercises provided during therapy sessions    Assessment     3/10 left anterior knee pain eliminated with clams/quad isos. Patient able to tolerate loaded sit to stands and step ups well; he reports minimal pain in right hip/knee occasionally. Poor control of knee during CKC activity.    The patient's current job specific task deficits include the following: impaired standing tolerance and mobility.     José Miguel is making good progress towards meeting his goals.     Patient prognosis is: Good.   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    Pt's spiritual, cultural and educational needs considered and pt agreeable to plan of care and goals.     Anticipated barriers to physical therapy: chronicity of current injury, objective abilities vs. self-perceived abilities, and self-limiting behaviors due to pain    Goals:   Short Term Goals: 4 weeks   (1) patient will be independent with HOME EXERCISE PROGRAM.   -MET, 6/16/2023.     (2) patient will demo right knee AROM flexion to 135 deg for symmetry.    -MET, 6/16/2023, 137 DEG.     (3) patient will demo bilateral hip and knee strength 5/5 for RTW. (progressing, not met)      Long Term Goals: 6 weeks   (1) patient will demo standing therex tolerance x 30 mins towards RTW as thibodaeux. (progressing)      (2) patient will demo FOTO impairment score 43% for improved QOL. 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 (progressing, not met)    Plan     Continue current POC with emphasis on improving functional strength and ROM.     Juventino Mckeon, PT   6/19/2023

## 2023-06-21 ENCOUNTER — CLINICAL SUPPORT (OUTPATIENT)
Dept: REHABILITATION | Facility: HOSPITAL | Age: 33
End: 2023-06-21
Payer: OTHER MISCELLANEOUS

## 2023-06-21 DIAGNOSIS — R68.89 DECREASED FUNCTIONAL ACTIVITY TOLERANCE: Primary | ICD-10-CM

## 2023-06-21 DIAGNOSIS — R29.898 WEAKNESS OF RIGHT LOWER EXTREMITY: ICD-10-CM

## 2023-06-21 PROCEDURE — 97112 NEUROMUSCULAR REEDUCATION: CPT | Mod: PO,CQ

## 2023-06-21 PROCEDURE — 97140 MANUAL THERAPY 1/> REGIONS: CPT | Mod: PO,CQ

## 2023-06-21 PROCEDURE — 97530 THERAPEUTIC ACTIVITIES: CPT | Mod: PO,CQ

## 2023-06-21 PROCEDURE — 97110 THERAPEUTIC EXERCISES: CPT | Mod: PO,CQ

## 2023-06-21 NOTE — PROGRESS NOTES
[facility]   Reassessment: Workers' Compensation Physical Therapy Treatment Note      Name: José Miguel Motta  [facility] Number: [id number]    Therapy Diagnosis:   Encounter Diagnoses   Name Primary?    Decreased functional activity tolerance Yes    Weakness of right lower extremity      Physician: Trace Candelaria MD    Visit Date: 6/21/2023    Physician Orders: PT Eval and Treat   Post Surgical? No Eval and Treat Yes Type of Therapy Outpatient Therapy   Medical Diagnosis from Referral: M25.561 (ICD-10-CM) - Pain in right knee   Evaluation Date: 5/18/2023  Authorization Period Expiration: 05/08/2024   Plan of Care Expiration: 7/7/2023  Progress Note Due: 6/17/2023  Visit # / Visits authorized: 8/18  PTA: 0/5    (# of No Show Appts 0/Number of Cancelled Appts 1)    FOTO: 2/5    Time In: 10:05  Time Out: 11:08  Total Appointment Time: 63 minutes    Precautions: Standard    Prior Medical Treatment: He underwent conservative therapy x 3.5 mo prior to knee and toe surgery. He saw very minimal relief.  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  Response to previous treatment: no adverse effects   Function: too soon to determine    Pain: 3/10 knee;   Location: R knee    His center back hurts.    Objective      Objective Measures updated at progress report unless specified.   Observations: Antalgic gait pattern  "is not present. However, decreased right knee TKE at heel strike is present. (QUAD WEAKNESS LIKELY GIVEN FULL KNEE EXTENSION)  Palpation: pt tenderness to palpation to med and lat joint line. Swelling present? yes       Limitation/Restriction for FOTO KNEE Survey     Therapist reviewed FOTO scores   FOTO documents entered into [facility] - see Media section.     Limitation Score: 59%  Predicted Score: 43%  6/16/2023: 54%      Treatment     José Miguel received the treatments listed below:      therapeutic exercises to develop strength, endurance, ROM, flexibility, posture, and core stabilization for 20 minutes including:  Upright bike x 7 minutes, patient sets resistance  Standing gastroc stretch on incline x 1 minute x 2 (Gastroc and Soleus)-  Quad set + heel lift 2x10, 5 sec hold   Side lying hip adduction 2 x 10, 3#  SLR 3x10, 3#   S/L hip abduction 3x10, 3# (B)   SINGLE LEG bridge 2 x 10, 3"    Seated precor: knee extension eccentric: 2 up, 1 down (R), 2x10, 20#   BLE shuttle squats 3x10, 100#  SL shuttle squats 2x10, 62.5#   Lateral walking (green TB at thighs) x 30 ft x 3 (straight knees, no mini squat due to pain)    Manual therapy x 10 mins:  Fat pad mobs  Scar massage  Inferior patellar mobs grade III-IV    neuromuscular re-education activities to improve: Balance, Coordination, Proprioception, and Posture for 20 minutes. The following activities were included:  Bridge 2x10 (green TB, cues for proper gluteal engagement)  Standing TKE (ball against wall) 2x10, 3 sec hold   Clams with manual resistance 4x15s bilateral   Quad Iso on knee ext machine 5x15s      therapeutic activities to improve functional performance for 10 minutes, including:  Sit to stand from standard chair + 15# med ball 3x8  Step up (12 inch step) + knee drive 20# KB 3x8    Not performed:  Lat walk with ball 20 feet x 3 laps      Patient Education and Home Exercises       Education provided:   - HEP compliance   - knuckle scar massage in sitting to " knee    Home Exercises Provided: Patient instructed to cont prior HEP. Exercises were reviewed and José Miguel was able to demonstrate them prior to the end of the session.  José Miguel demonstrated good  understanding of the education provided. See EMR under Patient Instructions for exercises provided during therapy sessions    Assessment     José Miguel julia today's tx with return to previous level of exs well. Patient able to tolerate loaded sit to stands and step ups well; he reports minimal pain in right knee occasionally. Fair control of knee during CKC activity.    The patient's current job specific task deficits include the following: impaired standing tolerance and mobility.     José Miguel is making good progress towards meeting his goals.     Patient prognosis is: Good.   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    Pt's spiritual, cultural and educational needs considered and pt agreeable to plan of care and goals.     Anticipated barriers to physical therapy: chronicity of current injury, objective abilities vs. self-perceived abilities, and self-limiting behaviors due to pain    Goals:   Short Term Goals: 4 weeks   (1) patient will be independent with HOME EXERCISE PROGRAM.   -MET, 6/16/2023.     (2) patient will demo right knee AROM flexion to 135 deg for symmetry.    -MET, 6/16/2023, 137 DEG.     (3) patient will demo bilateral hip and knee strength 5/5 for RTW. 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 (progressing)      (2) patient will demo FOTO impairment score 43% for improved QOL. (progressing, not met)       (3) pt will return to work full duty, full time as [employer]. (progressing, not met)    Plan     Continue current POC with emphasis on improving functional strength and ROM.     Jovany Jin, PTA   6/21/2023

## 2023-06-23 ENCOUNTER — CLINICAL SUPPORT (OUTPATIENT)
Dept: REHABILITATION | Facility: HOSPITAL | Age: 33
End: 2023-06-23
Payer: OTHER MISCELLANEOUS

## 2023-06-23 DIAGNOSIS — R29.898 WEAKNESS OF RIGHT LOWER EXTREMITY: ICD-10-CM

## 2023-06-23 DIAGNOSIS — R68.89 DECREASED FUNCTIONAL ACTIVITY TOLERANCE: Primary | ICD-10-CM

## 2023-06-23 PROCEDURE — 97110 THERAPEUTIC EXERCISES: CPT | Mod: PO,CQ

## 2023-06-23 PROCEDURE — 97112 NEUROMUSCULAR REEDUCATION: CPT | Mod: PO,CQ

## 2023-06-23 PROCEDURE — 97530 THERAPEUTIC ACTIVITIES: CPT | Mod: PO,CQ

## 2023-06-23 NOTE — PROGRESS NOTES
[facility]   Workers' Compensation Physical Therapy Treatment Note      Name: José Miguel Motta  Clinic Number: [id number]    Therapy Diagnosis:   Encounter Diagnoses   Name Primary?    Decreased functional activity tolerance Yes    Weakness of right lower extremity      Physician: Trace Candelaria MD    Visit Date: 6/23/2023    Physician Orders: PT Eval and Treat   Post Surgical? No Eval and Treat Yes Type of Therapy Outpatient Therapy   Medical Diagnosis from Referral: M25.561 (ICD-10-CM) - Pain in right knee   Evaluation Date: 5/18/2023  Authorization Period Expiration: 05/08/2024   Plan of Care Expiration: 7/7/2023  Progress Note Due: 6/17/2023  Visit # / Visits authorized: 10/18  PTA: 0/5    (# of No Show Appts 0/Number of Cancelled Appts 1)    FOTO: 2/5    Time In: 0903 AM  Time Out:   Total Appointment Time: 63 minutes    Precautions: Standard    Prior Medical Treatment: He underwent conservative therapy x 3.5 mo prior to knee and toe surgery. He saw very minimal relief.  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  Response to previous treatment: no adverse effects   Function: too soon to determine    Pain: 3/10 knee;   Location: R knee    His center back hurts.    Objective    Objective Measures updated at progress report unless specified.     Treatment     José Miguel received the treatments listed below:      therapeutic exercises to  "develop strength, endurance, ROM, flexibility, posture, and core stabilization for 20 minutes including:  Upright bike x 7 minutes, patient sets resistance  Standing gastroc stretch on incline x 1 minute x 2 (Gastroc and Soleus)-  Quad set + heel lift 2x10, 5 sec hold   Side lying hip adduction 2 x 10, 3# (NP)  SLR 3x10, 3#   S/L hip abduction 3x10, 3# (B)   SINGLE LEG bridge 2 x 10, 3"    Seated precor: knee extension eccentric: 2 up, 1 down (R), 2x10, 20#   BLE shuttle squats 3x10, 100#  SL shuttle squats 2x10, 62.5#   Lateral walking (green TB at thighs) x 30 ft x 3 (straight knees, no mini squat due to pain)    Manual therapy x 10 mins:  Fat pad mobs, tibial IR   Inferior patellar mobs grade III-IV    neuromuscular re-education activities to improve: Balance, Coordination, Proprioception, and Posture for 20 minutes. The following activities were included:  Bridge 2x10 (green TB, cues for proper gluteal engagement)  Standing TKE (ball against wall) 2x10, 3 sec hold   Clams with manual resistance 4x15s bilateral   Quad Iso on knee ext machine 5x15s    therapeutic activities to improve functional performance for 10 minutes, including:  Sit to stand from standard chair + 25# med ball 3x8  Step up (12 inch step) + knee drive 20# KB 3x8    Patient Education and Home Exercises       Education provided:   - HEP compliance   - knuckle scar massage in sitting to knee    Home Exercises Provided: Patient instructed to cont prior HEP. Exercises were reviewed and José Miguel was able to demonstrate them prior to the end of the session.  José Miguel demonstrated good  understanding of the education provided. See EMR under Patient Instructions for exercises provided during therapy sessions    Assessment     José Miguel able to achieve proper lateral gluteal activation with isometric clamshells today. Mild complaints of anterior knee pain with quad sets but this resolved completely with manual therapy techniques. Patient demonstrating " good tolerance to eccentric quad focused exercises.     The patient's current job specific task deficits include the following: impaired standing tolerance and mobility.     José Miguel is making good progress towards meeting his goals.     Patient prognosis is: Good.   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    Pt's spiritual, cultural and educational needs considered and pt agreeable to plan of care and goals.     Anticipated barriers to physical therapy: chronicity of current injury, objective abilities vs. self-perceived abilities, and self-limiting behaviors due to pain    Goals:   Short Term Goals: 4 weeks   (1) patient will be independent with HOME EXERCISE PROGRAM.   -MET, 6/16/2023.     (2) patient will demo right knee AROM flexion to 135 deg for symmetry.    -MET, 6/16/2023, 137 DEG.     (3) patient will demo bilateral hip and knee strength 5/5 for RTW. 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 (progressing)      (2) patient will demo FOTO impairment score 43% for improved QOL. 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 (progressing, not met)    Plan     Continue current POC with emphasis on improving functional strength and ROM.     Keeley Terrell, PTA   6/23/2023

## 2023-06-26 ENCOUNTER — CLINICAL SUPPORT (OUTPATIENT)
Dept: REHABILITATION | Facility: HOSPITAL | Age: 33
End: 2023-06-26
Payer: OTHER MISCELLANEOUS

## 2023-06-26 DIAGNOSIS — R68.89 DECREASED FUNCTIONAL ACTIVITY TOLERANCE: Primary | ICD-10-CM

## 2023-06-26 DIAGNOSIS — R29.898 WEAKNESS OF RIGHT LOWER EXTREMITY: ICD-10-CM

## 2023-06-26 PROCEDURE — 97110 THERAPEUTIC EXERCISES: CPT | Mod: PO,CQ

## 2023-06-26 PROCEDURE — 97112 NEUROMUSCULAR REEDUCATION: CPT | Mod: PO,CQ

## 2023-06-26 PROCEDURE — 97530 THERAPEUTIC ACTIVITIES: CPT | Mod: PO,CQ

## 2023-06-26 NOTE — PROGRESS NOTES
[facility]   Workers' Compensation Physical Therapy Treatment Note      Name: José Miguel Motta  Clinic Number: [id number]    Therapy Diagnosis:   Encounter Diagnoses   Name Primary?    Decreased functional activity tolerance Yes    Weakness of right lower extremity      Physician: Trace Candelaria MD    Visit Date: 6/26/2023    Physician Orders: PT Eval and Treat   Post Surgical? No Eval and Treat Yes Type of Therapy Outpatient Therapy   Medical Diagnosis from Referral: M25.561 (ICD-10-CM) - Pain in right knee   Evaluation Date: 5/18/2023  Authorization Period Expiration: 05/08/2024   Plan of Care Expiration: 7/7/2023  Progress Note Due: 6/17/2023  Visit # / Visits authorized: 11/18  PTA: 1/5    (# of No Show Appts 0/Number of Cancelled Appts 1)    FOTO: 2/5    Time In: 1005 AM  Time Out: 1102 AM  Total Appointment Time: 40 minutes    Precautions: Standard    Prior Medical Treatment: He underwent conservative therapy x 3.5 mo prior to knee and toe surgery. He saw very minimal relief.  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 Patient states his toe has really been bothering him and he presents to clinic in a darco shoe today. He was compliant with home exercise program.  Response to previous treatment: no adverse effects   Function: too soon to determine    Pain: 3/10 knee   Location: R knee    His center back  "hurts.    Objective    Objective Measures updated at progress report unless specified.     Treatment     José Miguel received the treatments listed below:      therapeutic exercises to develop strength, endurance, ROM, flexibility, posture, and core stabilization for 20 minutes including:  Upright bike x 7 minutes, patient sets resistance  Standing gastroc stretch on incline x 1 minute x 2 (Gastroc and Soleus)  Quad set + heel lift 2x10, 5 sec hold   Side lying hip adduction 2 x 10, 3# (NP)  SLR 3x10, 3#   S/L hip abduction 3x10, 3# (B)   SINGLE LEG bridge 2 x 10, 3"    Seated precor: knee extension eccentric: 2 up, 1 down (R), 2x10, 20#   BLE shuttle squats 3x10, 100#  SL shuttle squats 2x10, 62.5#   Lateral walking (green TB at thighs) x 30 ft x 3 (straight knees, no mini squat due to pain)    Manual therapy x 10 mins:  Fat pad mobs, tibial IR   Inferior patellar mobs grade III-IV    neuromuscular re-education activities to improve: Balance, Coordination, Proprioception, and Posture for 20 minutes. The following activities were included:  Bridge 2x10 (green TB, cues for proper gluteal engagement)  Standing TKE (ball against wall) 2x10, 3 sec hold   Clams with manual resistance 4x15s bilateral   Quad Iso on knee ext machine 5x15s    therapeutic activities to improve functional performance for 10 minutes, including:  Sit to stand from standard chair + 25# med ball 3x8  Step up (12 inch step) + knee drive 20# KB 3x8 (manual cues for hip abductor faciliation)     Patient Education and Home Exercises       Education provided:   - HEP compliance   - knuckle scar massage in sitting to knee    Home Exercises Provided: Patient instructed to cont prior HEP. Exercises were reviewed and José Miguel was able to demonstrate them prior to the end of the session.  José Miguel demonstrated good  understanding of the education provided. See EMR under Patient Instructions for exercises provided during therapy sessions    Assessment "     Medial knee pain during step ups resolves with manual cues for hip abductor facilitation. José Miguel able to achieve proper lateral gluteal activation with isometric clamshells today.Patient demonstrating good tolerance to eccentric quad focused exercises.     The patient's current job specific task deficits include the following: impaired standing tolerance and mobility.     José Miguel is making good progress towards meeting his goals.     Patient prognosis is: Good.   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    Pt's spiritual, cultural and educational needs considered and pt agreeable to plan of care and goals.     Anticipated barriers to physical therapy: chronicity of current injury, objective abilities vs. self-perceived abilities, and self-limiting behaviors due to pain    Goals:   Short Term Goals: 4 weeks   (1) patient will be independent with HOME EXERCISE PROGRAM.   -MET, 6/16/2023.     (2) patient will demo right knee AROM flexion to 135 deg for symmetry.    -MET, 6/16/2023, 137 DEG.     (3) patient will demo bilateral hip and knee strength 5/5 for RTW. 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 (progressing)      (2) patient will demo FOTO impairment score 43% for improved QOL. 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 (progressing, not met)    Plan     Continue current POC with emphasis on improving functional strength and ROM.     Keeley Terrell, PTA   6/26/2023

## 2023-06-28 ENCOUNTER — CLINICAL SUPPORT (OUTPATIENT)
Dept: REHABILITATION | Facility: HOSPITAL | Age: 33
End: 2023-06-28
Payer: OTHER MISCELLANEOUS

## 2023-06-28 DIAGNOSIS — R29.898 WEAKNESS OF RIGHT LOWER EXTREMITY: ICD-10-CM

## 2023-06-28 DIAGNOSIS — R68.89 DECREASED FUNCTIONAL ACTIVITY TOLERANCE: Primary | ICD-10-CM

## 2023-06-28 PROCEDURE — 97110 THERAPEUTIC EXERCISES: CPT | Mod: PO | Performed by: PHYSICAL THERAPIST

## 2023-06-28 PROCEDURE — 97112 NEUROMUSCULAR REEDUCATION: CPT | Mod: PO | Performed by: PHYSICAL THERAPIST

## 2023-06-28 PROCEDURE — 97140 MANUAL THERAPY 1/> REGIONS: CPT | Mod: PO | Performed by: PHYSICAL THERAPIST

## 2023-06-28 NOTE — PROGRESS NOTES
[facility]   Workers' Compensation Physical Therapy Treatment Note      Name: José Miguel Motta  Clinic Number: [id number]    Therapy Diagnosis:   Encounter Diagnoses   Name Primary?    Decreased functional activity tolerance Yes    Weakness of right lower extremity        Physician: Trace Candelaria MD    Visit Date: 6/28/2023    Physician Orders: PT Eval and Treat   Post Surgical? No Eval and Treat Yes Type of Therapy Outpatient Therapy   Medical Diagnosis from Referral: M25.561 (ICD-10-CM) - Pain in right knee   Evaluation Date: 5/18/2023  Authorization Period Expiration: 05/08/2024   Plan of Care Expiration: 7/7/2023, 8/10/2023   Progress Note Due: 6/17/2023  Visit # / Visits authorized: 12/18  PTA: 0/5    (# of No Show Appts 0/Number of Cancelled Appts 1)    FOTO: 4/5    Time In: 1019 AM (pt. arrived late)    Time Out: 11:24 AM  Total Appointment Time: 55 minutes    Precautions: Standard    Prior Medical Treatment: He underwent conservative therapy x 3.5 mo prior to knee and toe surgery. He saw very minimal relief.  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 Patient states his toe has really been bothering him and he presents to clinic in a slide today. He was compliant with home exercise program.  Response to previous treatment: no adverse effects   Function: feels that ROM and ability has improved.  "    Pain: 3/10 knee   Location: R knee    His center back hurts likely due to being on his feet a lot yesterday.     Objective    Objective Measures updated at progress report unless specified.   Observations: Antalgic gait pattern is not present. However, decreased right knee TKE at heel strike is present. (QUAD WEAKNESS LIKELY GIVEN FULL KNEE EXTENSION)  Palpation: pt tenderness to palpation to med and lat joint line. Swelling present? yes     Knee Right     Left     Comment as applicable    (Normal, in degrees) AROM PROM MMT AROM PROM MMT     Flexion (135)  132 137+ 4/5 135 - 5/5 + pn w/ over pressure flex   Extension (0-5) 0 - 4+/5 0 - 5/5        Ankle strength: bilateral grossly 5/5.     Hip strength:  Right-tested/Left not tested:  Hip flexion: 5/5; 5/5  Hip Abduction: 4+/5; 5/5  Hip adduction: 4+/5; 5/5  Hip ext: 4/5, 4/5    Ankle strength: (right)  DF: 5/5  PF: 5/5     Muscle length ((+): Positive, (-): Negative):   - Hamstring 90/90 = (+)  - ITB Geoffrey's testing = (+) (lateral patella tilt bilateral knee)     Meniscus testing: Cluster exam for meniscal injury: Met 2/5  - (neg) Hx joint locking, (+) Joint line tenderness, (-) Paul, (+) Pain with overpressure flexion, (-) Pain with overpressure extension:      Cluster exam for knee OA: Cluster exam for knee OA: Met 1/5  - (neg) Age > 49 y/o, stiffness > 30', (neg) crepitus, (neg) bony tenderness, (pos) no palpable warmth     Functional Screening: Pain with the following? (Y = yes, N = no), Comments if applicable  - Squatting: Y  - Lunging: Y  - Kneeling: Y  - Walking on heels: normal  - Walking on toes: Y, right toe (NT)  - Single leg Balance: 30" on floor, 15" on foam with moderate sway     [place] Knee Rules: X-Ray Indication (neg)     Functional Job Specific Testing: [employer]  Job Specific Task Job Demands Current Ability   1. 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 Mobility skills Sweeping, walking 30 mins around home    3. N/A        " "  Per pt report: Pt reports that he can walk a mile but needs to stop d/t R foot and knee pain.    Limitation/Restriction for FOTO KNEE Survey     Therapist reviewed FOTO scores   FOTO documents entered into [facility] - see Media section.     Eval Limitation Score: 59%  Predicted Score: 43%  6/29/2023: 51%       Treatment     José Miguel received the treatments listed below:      therapeutic exercises to develop strength, endurance, ROM, flexibility, posture, and core stabilization for 20 minutes including:  Upright bike x 7 minutes, patient sets resistance  Standing gastroc stretch on incline x 1 minute x 2 (Gastroc and Soleus)  Quad set + heel lift 2x10, 5 sec hold   Side lying hip adduction 2 x 10, 3# (NP)  SLR 3x10, 3#   S/L hip abduction 3x10, 3# (B)   SINGLE LEG bridge 2 x 10, 3"  Hip Adductor stretch with strap 3 x 30"  Reassessment.    Seated precor: knee extension eccentric: 2 up, 1 down (R),  2x10, 20#  (np due to time limit)  BLE shuttle squats 3x10, 100# (np due to time limit)  SL shuttle squats 2x10, 62.5# (np due to time limit)  Lateral walking (green TB at thighs) x 30 ft x 3 (straight knees, no mini squat due to pain)(np due to time limit)    Manual therapy x 10 mins:  Fat pad mobs, tibial IR   Inferior patellar mobs grade III-IV (np due to time limit)    neuromuscular re-education activities to improve: Balance, Coordination, Proprioception, and Posture for 20 minutes. The following activities were included:  Bridge 2x10 (green TB, cues for proper gluteal engagement)  Standing TKE (ball against wall) 2x10, 3 sec hold (np due to time limit)  Clams with manual resistance 4x15s bilateral (np due to time limit)  Quad Iso on knee ext machine 5x15s(np due to time limit)    therapeutic activities to improve functional performance for 10 minutes, including:  Sit to stand from standard chair + 25# med ball 3x8 (np due to time limit)  Step up (12 inch step) + knee drive 20# KB 3x8 (manual cues for hip abductor " faciliation) (np due to time limit)    Patient Education and Home Exercises       Education provided:   - HEP compliance   - knuckle scar massage in sitting to knee    Home Exercises Provided: Patient instructed to cont prior HEP. Exercises were reviewed and José Miguel was able to demonstrate them prior to the end of the session.  José Miguel demonstrated good  understanding of the education provided. See EMR under Patient Instructions for exercises provided during therapy sessions    Assessment     José Miguel's R knee pain is mechanical in nature with improvements noted by manual therapy to mobs and fat pad mobs. However, he continues limited in progressing by back, hip, and foot pain. He arrived in [facility] for functional activities today. Medial knee pain and possibly pes anserine tendonitis for which he was given a adductor stretch to help with. Patient demonstrating good tolerance to eccentric quad focused exercises.     The patient's current job specific task deficits include the following: impaired standing tolerance and mobility.     José Miguel is making good progress towards meeting his goals.     Patient prognosis is: Good.   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    Pt's spiritual, cultural and educational needs considered and pt agreeable to plan of care and goals.     Anticipated barriers to physical therapy: chronicity of current injury, objective abilities vs. self-perceived abilities, and self-limiting behaviors due to pain    Goals:   Short Term Goals: 4 weeks   (1) patient will be independent with HOME EXERCISE PROGRAM.   -MET, 6/16/2023.    (2) patient will demo right knee AROM flexion to 135 deg  for symmetry.    -MET, 6/16/2023, 137 DEG.     (3) patient will demo bilateral hip and knee strength 5/5 for RTW. 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 (progressing, not met)      (2) patient will demo FOTO impairment score 43% for improved QOL. 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 (progressing, not met)    Plan     Continue current POC with emphasis on improving functional strength and ROM.     Deisy Hay, SPT  PT student    I certify that I was present in the room directing the student in service delivery and guiding them using my skilled judgment. As the co-signing therapist I have reviewed the students documentation and am responsible for the treatment, assessment, and plan.      Edie Lerma, PT

## 2023-06-29 NOTE — PLAN OF CARE
[facility]  Physical Therapy Plan of Care Note     Name: José Miguel Motta  Clinic Number: [id number]    Therapy Diagnosis:   Encounter Diagnoses   Name Primary?    Decreased functional activity tolerance Yes    Weakness of right lower extremity      Physician: Trace Candelaria MD    Visit Date: 6/28/2023    Physician Orders: Eval and Treat   Medical Diagnosis from Referral: M25.561 (ICD-10-CM) - Pain in right knee   Evaluation Date: 5/18/2023  Authorization Period Expiration: 05/08/2024    Plan of Care Expiration: 7/7/2023, 8/10/2023   Progress Note Due: 6/17/2023   Visit # / Visits authorized: 12/18  FOTO: 0/5    Precautions: Standard  Functional Level Prior to Evaluation:  Full duty, no knee, back, or foot pain prior to injury    SUBJECTIVE     Update:   Pt reports: his knee still has that same medial knee pain but it does feel better at times. Patient states his toe has really been bothering him and he presents to clinic in a slide today. He was compliant with home exercise program.  Response to previous treatment: no adverse effects   Function: feels that ROM and ability has improved.      Pain: 3/10 knee   Location: R knee     His center back hurts likely due to being on his feet a lot yesterday.     OBJECTIVE     Update: Observations: Antalgic gait pattern is not present. However, decreased right knee TKE at heel strike is present. (QUAD WEAKNESS LIKELY GIVEN FULL KNEE EXTENSION)  Palpation: pt tenderness to palpation to med and lat joint line. Swelling present? yes     Knee Right     Left     Comment as applicable    (Normal, in degrees) AROM PROM MMT AROM PROM MMT     Flexion (135)  132 137+ 4/5 135 - 5/5 + pn w/ over pressure flex   Extension (0-5) 0 - 4+/5 0 - 5/5        Ankle strength: bilateral grossly 5/5.     Hip strength:  Right-tested/Left not tested:  Hip flexion: 5/5; 5/5  Hip Abduction: 4+/5; 5/5  Hip adduction: 4+/5; 5/5  Hip ext: 4/5, 4/5     Ankle strength:  "(right)  DF: 5/5  PF: 5/5     Muscle length ((+): Positive, (-): Negative):   - Hamstring 90/90 = (+)  - ITB Geoffrey's testing = (+) (lateral patella tilt bilateral knee)     Meniscus testing: Cluster exam for meniscal injury: Met 2/5  - (neg) Hx joint locking, (+) Joint line tenderness, (-) Paul, (+) Pain with overpressure flexion, (-) Pain with overpressure extension:      Cluster exam for knee OA: Cluster exam for knee OA: Met 1/5  - (neg) Age > 49 y/o, stiffness > 30', (neg) crepitus, (neg) bony tenderness, (pos) no palpable warmth     Functional Screening: Pain with the following? (Y = yes, N = no), Comments if applicable  - Squatting: Y  - Lunging: Y  - Kneeling: Y  - Walking on heels: normal  - Walking on toes: Y, right toe (NT)  - Single leg Balance: 30" on floor, 15" on foam with moderate sway     [place] Knee Rules: X-Ray Indication (neg)     Functional Job Specific Testing: [employer]  Job Specific Task Job Demands Current Ability   1. 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 Mobility skills Sweeping, walking 30 mins around home    3. N/A          Per pt report: Pt reports that he can walk a mile but needs to stop d/t R foot and knee pain.     Limitation/Restriction for FOTO KNEE Survey     Therapist reviewed FOTO scores   FOTO documents entered into [id number] - see Media section.     Eval Limitation Score: 59%  Predicted Score: 43%  6/29/2023: 51%           ASSESSMENT     Update: Debbie R knee pain is mechanical in nature with improvements noted by manual therapy to mobs and fat pad mobs. However, he continues limited in progressing by back, hip, and foot pain. He arrived in [facility] for functional activities today. Medial knee pain and possibly pes anserine tendonitis for which he was given a adductor stretch to help with. Patient demonstrating good tolerance to eccentric quad focused exercises.      The patient's current job specific task deficits include the following: impaired " standing tolerance and mobility.     Previous Short Term Goals Status: 2/3 Short term goals MET; pt. Is still progressing towards his last goal: bilateral hip and knee strength of 5/5 for RTW.   New Short Term Goals Status: prior goal remains relevant.   Long Term Goal Status: continue per initial plan of care.  Reasons for Recertification of Therapy:   pt continues having right knee pain limited by back and foot pain. 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     GOALS  Short Term Goals: 4 weeks   (1) patient will be independent with HOME EXERCISE PROGRAM.              -MET, 6/16/2023.     (2) patient will demo right knee AROM flexion to 135 deg for symmetry.               -MET, 6/16/2023, 137 DEG.     (3) patient will demo bilateral hip and knee strength 5/5 for RTW. 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 (progressing, not met)      (2) patient will demo FOTO impairment score 43% for improved QOL. 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 (progressing, not met)    PLAN     Updated Certification Period: 6/29/2023 to 8/10/2023   Recommended Treatment Plan: 1-3 times per week for 6 weeks:  Gait Training, Manual Therapy, Neuromuscular Re-ed, Patient Education, Self Care, Therapeutic Activities, and Therapeutic Exercise  Other Recommendations: n/a    Deisy Hay     I certify that I was present in the room directing the student in service delivery and guiding them using my skilled judgement. As the co-signing therapist, I have reviewed the students documentation and am responsible for the treatment, assessment, and plan.     Edie Lerma, PT    I CERTIFY THE NEED FOR THESE SERVICES FURNISHED UNDER THIS PLAN OF  TREATMENT AND WHILE UNDER MY CARE    Physician's comments:        Physician's Signature: ___________________________________________________ DATE:_______________________

## 2023-06-30 ENCOUNTER — CLINICAL SUPPORT (OUTPATIENT)
Dept: REHABILITATION | Facility: HOSPITAL | Age: 33
End: 2023-06-30
Payer: OTHER MISCELLANEOUS

## 2023-06-30 DIAGNOSIS — R68.89 DECREASED FUNCTIONAL ACTIVITY TOLERANCE: Primary | ICD-10-CM

## 2023-06-30 DIAGNOSIS — R29.898 WEAKNESS OF RIGHT LOWER EXTREMITY: ICD-10-CM

## 2023-06-30 PROCEDURE — 97112 NEUROMUSCULAR REEDUCATION: CPT | Mod: PO,CQ

## 2023-06-30 PROCEDURE — 97110 THERAPEUTIC EXERCISES: CPT | Mod: PO,CQ

## 2023-06-30 NOTE — PROGRESS NOTES
[facility]   Workers' Compensation Physical Therapy Treatment Note      Name: José Miguel Motta  Clinic Number: [id number]    Therapy Diagnosis:   Encounter Diagnoses   Name Primary?    Decreased functional activity tolerance Yes    Weakness of right lower extremity      Physician: Trace Candelaria MD    Visit Date: 7/3/2023    Physician Orders: PT Eval and Treat   Post Surgical? No Eval and Treat Yes Type of Therapy Outpatient Therapy   Medical Diagnosis from Referral: M25.561 (ICD-10-CM) - Pain in right knee   Evaluation Date: 5/18/2023  Authorization Period Expiration: 05/08/2024   Plan of Care Expiration: 7/7/2023, 8/10/2023   Progress Note Due: 6/17/2023, 7/29/2023  Visit # / Visits authorized: 14/18  PTA: 0/5    (# of No Show Appts 0/Number of Cancelled Appts 1)    FOTO: 4/5    Time In: 1305 AM   Time Out: 1359 AM  Total Appointment Time: 55 minutes    Precautions: Standard    Prior Medical Treatment: He underwent conservative therapy x 3.5 mo prior to knee and toe surgery. He saw very minimal relief.  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 Pt. Spoke with his doctor and his doctor believes he may be close to his max improvement of his knee. Patient states his toe has really been bothering him as well as his back. He was compliant with home exercise program.  Response to previous treatment: mild  "soreness  Function: feels that ROM and ability has improved.     Pain: 3/10 knee (constant pain, sometimes goes up to a 5)   Location: R knee    His center back hurts likely due to being on his feet a lot yesterday.     Objective    Objective Measures updated at progress report unless specified.     Treatment     José Miguel received the treatments listed below:      therapeutic exercises to develop strength, endurance, ROM, flexibility, posture, and core stabilization for 25 minutes including:  Upright bike x 7 minutes, patient sets resistance  Standing gastroc stretch on incline x 1 minute x 2 (Gastroc and Soleus)  Quad set + heel lift 2x10, 5 sec hold (NP)  Side lying hip adduction 2 x 10, 3#   SLR (R) 3x10, 3#   S/L hip abduction 3x10, 3# (B)   SINGLE LEG bridge 2 x 10, 3"  Hip Adductor stretch with strap 3 x 30"  Piriformis stretch 3 x 30 (B)    Seated precor: knee extension eccentric: 2 up, 1 down (R),  2x10, 20#    BLE shuttle squats 3x10, 100# np  SL shuttle squats 2x10, 62.5# np  Lateral walking (green TB at thighs) x 30 ft x 3 (straight knees, no mini squat due to pain)    Manual therapy x 00 mins:  Fat pad mobs, tibial IR (np)  Inferior patellar mobs grade III-IV (NP)     neuromuscular re-education activities to improve: Balance, Coordination, Proprioception, and Posture for 20 minutes. The following activities were included:  Standing TKE (ball against wall) 2x10, 3 sec hold   Clams with manual resistance 4x15s bilateral (np)   Quad Iso on knee ext machine 5x15s (np)     therapeutic activities to improve functional performance for 10 minutes, including:  Sit to stand from standard chair + 25# med ball 3x8   Step up (12 inch step) + knee drive 20# KB 3x8 (manual cues for hip abductor faciliation)     Patient Education and Home Exercises       Education provided:   - HEP compliance   - knuckle scar massage in sitting to knee  - standing with knees bent to avoid chronic genu recurvatum     Home Exercises " Provided: Patient instructed to cont prior HEP. Exercises were reviewed and José Miguel was able to demonstrate them prior to the end of the session.  José Miguel demonstrated good  understanding of the education provided. See EMR under Patient Instructions for exercises provided during therapy sessions    Assessment     Sabis R knee pain is mechanical in nature with improvements noted by manual therapy to mobs and fat pad mobs. Pt. Had experienced mild to mod knee pain this weekend after chronic standing so the importance of standing with knees bent to avoid chronic genu recurvatum was advised. However, he continues limited in progressing by back, hip, and foot pain. Pt. Spoke with  And he told him he may be close to his max improvement. Pt has been advised that table exercises will be discontinued to home so that he can focus on progressed exercises in the clinic.     The patient's current job specific task deficits include the following: impaired standing tolerance and mobility.     José Miguel is making good progress towards meeting his goals.     Patient prognosis is: Good.   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    Pt's spiritual, cultural and educational needs considered and pt agreeable to plan of care and goals.     Anticipated barriers to physical therapy: chronicity of current injury, objective abilities vs. self-perceived abilities, and self-limiting behaviors due to pain    Goals:   Short Term Goals: 4 weeks   (1) patient will be independent with HOME EXERCISE PROGRAM.   -MET, 6/16/2023.    (2) patient will demo right knee AROM flexion to 135 deg for symmetry.    -MET, 6/16/2023, 137 DEG.      (3) patient will demo bilateral hip and knee strength 5/5 for RTW. 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 (progressing, not met)      (2) patient will demo FOTO impairment score 43% for improved QOL. 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 (progressing, not met)    Plan     Continue current POC with emphasis on improving functional strength and ROM.     Deisy Hay, SPT  Physical Therapy Student     I certify that I was present in the room directing the student in service delivery and guiding them using my skilled judgment. As the co-signing therapist I have reviewed the students documentation and am responsible for the treatment, assessment, and plan.      Edie Lerma, PT

## 2023-06-30 NOTE — PROGRESS NOTES
[facility]   Workers' Compensation Physical Therapy Treatment Note      Name: José Miguel Motta  Clinic Number: [id number]    Therapy Diagnosis:   Encounter Diagnoses   Name Primary?    Decreased functional activity tolerance Yes    Weakness of right lower extremity        Physician: Trace Candelaria MD    Visit Date: 6/30/2023    Physician Orders: PT Eval and Treat   Post Surgical? No Eval and Treat Yes Type of Therapy Outpatient Therapy   Medical Diagnosis from Referral: M25.561 (ICD-10-CM) - Pain in right knee   Evaluation Date: 5/18/2023  Authorization Period Expiration: 05/08/2024   Plan of Care Expiration: 7/7/2023, 8/10/2023   Progress Note Due: 6/17/2023  Visit # / Visits authorized: 13/18  PTA: 0/5    (# of No Show Appts 0/Number of Cancelled Appts 1)    FOTO: 4/5    Time In: 12:00 PM  Time Out: 12:45   Total Appointment Time: 45 minutes    Precautions: Standard    Prior Medical Treatment: He underwent conservative therapy x 3.5 mo prior to knee and toe surgery. He saw very minimal relief.  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 Patient states his toe is feeling a little better and he is wearing a tennis shoe today.   He was compliant with home exercise program.  Response to previous treatment: no adverse effects   Function: feels that ROM and ability has improved.     Pain: 3/10 knee  "  Location: R knee        Objective      Limitation/Restriction for FOTO KNEE Survey     Therapist reviewed FOTO scores   FOTO documents entered into EPIC - see Media section.     Eval Limitation Score: 59%  Predicted Score: 43%  6/29/2023: 51%       Treatment     José Miguel received the treatments listed below:      therapeutic exercises to develop strength, endurance, ROM, flexibility, posture, and core stabilization for 25 minutes including:  Upright bike x 7 minutes, patient sets resistance  Standing gastroc stretch on incline x 1 minute x 2 (Gastroc and Soleus)  Quad set + heel lift 2x10, 5 sec hold   Side lying hip adduction 2 x 10, 3#   SLR 3x10, 3#   S/L hip abduction 3x10, 3# (B)   SINGLE LEG bridge 2 x 10, 3"  Hip Adductor stretch with strap 3 x 30"  Seated precor: knee extension eccentric: 2 up, 1 down (R),  2x10, 20#    BLE shuttle squats 3x10, 100#   SL shuttle squats 2x10, 62.5#   Lateral walking (green TB at thighs) x 30 ft x 3 (straight knees, no mini squat due to pain)    Manual therapy x 00 mins:  Fat pad mobs, tibial IR   Inferior patellar mobs grade III-IV (np due to time limit)    neuromuscular re-education activities to improve: Balance, Coordination, Proprioception, and Posture for 20 minutes. The following activities were included:  Bridge 2x10 (green TB, cues for proper gluteal engagement)  Standing TKE (ball against wall) 2x10, 3 sec hold (np due to time limit)  Clams with manual resistance 4x15s bilateral (np due to time limit)  Quad Iso on knee ext machine 5x15s    therapeutic activities to improve functional performance for 00 minutes, including:  Sit to stand from standard chair + 25# med ball 3x8 (np due to time limit)  Step up (12 inch step) + knee drive 20# KB 3x8 (manual cues for hip abductor faciliation) (np due to time limit)    Patient Education and Home Exercises       Education provided:   - HEP compliance   - knuckle scar massage in sitting to knee    Home Exercises Provided: " Patient instructed to cont prior HEP. Exercises were reviewed and José Miguel was able to demonstrate them prior to the end of the session.  José Miguel demonstrated good  understanding of the education provided. See EMR under Patient Instructions for exercises provided during therapy sessions    Assessment     José Miguel's julia today's tx with ther ex and neuromuscular re-ed well. He was able to return to previous level of activity today with min c/o R knee pain. He does display some fatigue with exs but recovers quickly after brief rest. However, he continues to be limited in progressing by back, hip, and foot pain. Patient demonstrating good tolerance to eccentric quad focused exercises.     The patient's current job specific task deficits include the following: impaired standing tolerance and mobility.     José Miguel is making good progress towards meeting his goals.     Patient prognosis is: Good.   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    Pt's spiritual, cultural and educational needs considered and pt agreeable to plan of care and goals.     Anticipated barriers to physical therapy: chronicity of current injury, objective abilities vs. self-perceived abilities, and self-limiting behaviors due to pain    Goals:   Short Term Goals: 4 weeks   (1) patient will be independent with HOME EXERCISE PROGRAM.   -MET, 6/16/2023.    (2) patient will demo right knee AROM flexion to 135 deg for symmetry.    -MET, 6/16/2023, 137 DEG.     (3) patient will demo bilateral hip and knee strength 5/5 for RTW. 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 (progressing, not met)      (2) patient will demo FOTO impairment score 43% for improved QOL. 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 (progressing, not met)    Plan     Continue current POC with emphasis on improving functional strength and ROM.     Deisy Hay, SPT  PT student    I certify that I was present in the room directing the student in service delivery and guiding them using my skilled judgment. As the co-signing therapist I have reviewed the students documentation and am responsible for the treatment, assessment, and plan.      Edie Lerma, PT

## 2023-07-03 ENCOUNTER — CLINICAL SUPPORT (OUTPATIENT)
Dept: REHABILITATION | Facility: HOSPITAL | Age: 33
End: 2023-07-03
Payer: OTHER MISCELLANEOUS

## 2023-07-03 DIAGNOSIS — R68.89 DECREASED FUNCTIONAL ACTIVITY TOLERANCE: Primary | ICD-10-CM

## 2023-07-03 DIAGNOSIS — R29.898 WEAKNESS OF RIGHT LOWER EXTREMITY: ICD-10-CM

## 2023-07-03 PROCEDURE — 97530 THERAPEUTIC ACTIVITIES: CPT | Mod: PO | Performed by: PHYSICAL THERAPIST

## 2023-07-03 PROCEDURE — 97110 THERAPEUTIC EXERCISES: CPT | Mod: PO | Performed by: PHYSICAL THERAPIST

## 2023-07-03 PROCEDURE — 97112 NEUROMUSCULAR REEDUCATION: CPT | Mod: PO | Performed by: PHYSICAL THERAPIST

## 2023-07-06 ENCOUNTER — CLINICAL SUPPORT (OUTPATIENT)
Dept: REHABILITATION | Facility: HOSPITAL | Age: 33
End: 2023-07-06
Payer: OTHER MISCELLANEOUS

## 2023-07-06 DIAGNOSIS — R68.89 DECREASED FUNCTIONAL ACTIVITY TOLERANCE: Primary | ICD-10-CM

## 2023-07-06 DIAGNOSIS — R29.898 WEAKNESS OF RIGHT LOWER EXTREMITY: ICD-10-CM

## 2023-07-06 PROCEDURE — 97112 NEUROMUSCULAR REEDUCATION: CPT | Mod: PO | Performed by: PHYSICAL THERAPIST

## 2023-07-06 PROCEDURE — 97530 THERAPEUTIC ACTIVITIES: CPT | Mod: PO | Performed by: PHYSICAL THERAPIST

## 2023-07-06 PROCEDURE — 97110 THERAPEUTIC EXERCISES: CPT | Mod: PO | Performed by: PHYSICAL THERAPIST

## 2023-07-06 NOTE — PROGRESS NOTES
[facility]   Workers' Compensation Physical Therapy Treatment Note      Name: José Miguel Motta  Clinic Number: [id number]    Therapy Diagnosis:   Encounter Diagnoses   Name Primary?    Decreased functional activity tolerance Yes    Weakness of right lower extremity      Physician: Trace Candelaria MD    Visit Date: 7/6/2023    Physician Orders: PT Eval and Treat   Post Surgical? No Eval and Treat Yes Type of Therapy Outpatient Therapy   Medical Diagnosis from Referral: M25.561 (ICD-10-CM) - Pain in right knee   Evaluation Date: 5/18/2023  Authorization Period Expiration: 05/08/2024   Plan of Care Expiration: 7/7/2023, 8/10/2023   Progress Note Due: 6/17/2023, 7/29/2023  Visit # / Visits authorized: 16/18  PTA: 0/5    (# of No Show Appts 0/Number of Cancelled Appts 1)    FOTO: 4/5    Time In: 1305  Time Out: 1355  Total Appointment Time: 50 minutes    Precautions: Standard    Prior Medical Treatment: He underwent conservative therapy x 3.5 mo prior to knee and toe surgery. He saw very minimal relief.  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 States that he had some knee pain yesterday likely due to the rainstorms. Pt. Spoke with his doctor and his doctor believes he may be close to his max improvement of his knee.   He was compliant with home exercise program.  Response to previous treatment: mild soreness  Function: feels that ROM and ability has improved.     Pain: 3/10 knee  "(constant pain, sometimes goes up to a 5 like when it rains)   Location: R knee  Objective    Objective Measures updated at progress report unless specified.     Treatment     José Miguel received the treatments listed below:      therapeutic exercises to develop strength, endurance, ROM, flexibility, posture, and core stabilization for 25 minutes including:  Upright bike x 7 minutes, patient sets resistance  Standing gastroc stretch on incline x 1 minute x 2 (Gastroc and Soleus)  Quad set + heel lift 2x10, 5 sec hold (NP)  Side lying hip adduction 2 x 10, 3# (NP)  SLR (R) 3x10, 3# (NP)  S/L hip abduction 3x10, 3# (B) (NP)  SINGLE LEG bridge 2 x 10, 3"(NP)  Hip Adductor stretch with strap 3 x 30"  Piriformis stretch 3 x 30 (B)    Seated precor: knee extension eccentric: 2 up, 1 down (R),  2x10, 20#    BLE shuttle squats 3x10, 100# np  SL shuttle squats 2x10, 62.5# np  Lateral walking (green TB at thighs) x 30 ft x 3 (straight knees, no mini squat due to pain)    Manual therapy x 00 mins:  Fat pad mobs, tibial IR (np)  Inferior patellar mobs grade III-IV (NP)     neuromuscular re-education activities to improve: Balance, Coordination, Proprioception, and Posture for 15 minutes. The following activities were included:  Standing TKE (ball against wall) 2x10, 3 sec hold   Clams with manual resistance 4x15s bilateral (np)   Quad Iso on knee ext machine 5x15s     therapeutic activities to improve functional performance for 10 minutes, including:  Sit to stand from standard chair + 25# med ball 3x8   Step up (12 inch step) + knee drive 20# 2 x 10   KB 3x8 (manual cues for hip abductor faciliation)     Patient Education and Home Exercises       Education provided:   - HEP compliance   - knuckle scar massage in sitting to knee  - standing with knees bent to avoid chronic genu recurvatum     Home Exercises Provided: Patient instructed to cont prior HEP. Exercises were reviewed and José Miguel was able to demonstrate them prior to " the end of the session.  José Miguel demonstrated good  understanding of the education provided. See EMR under Patient Instructions for exercises provided during therapy sessions    Assessment     PT spoke with MD about 15/18 visits waiting on further direction.     Pt. Had experienced mild knee pain yesterday likely due to the rain. However, he continues limited in progressing by hip especially with abduction. Pt. Did well with progression of exercises today and can discontinue the rest to HEP.      The patient's current job specific task deficits include the following: impaired standing tolerance and mobility.     José Miguel is making good progress towards meeting his goals.     Patient prognosis is: Good.   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    Pt's spiritual, cultural and educational needs considered and pt agreeable to plan of care and goals.     Anticipated barriers to physical therapy: chronicity of current injury, objective abilities vs. self-perceived abilities, and self-limiting behaviors due to pain    Goals:   Short Term Goals: 4 weeks   (1) patient will be independent with HOME EXERCISE PROGRAM.   -MET, 6/16/2023.    (2) patient will demo right knee AROM flexion to 135 deg for symmetry.    -MET, 6/16/2023, 137 DEG.     (3) patient will demo bilateral hip and knee strength 5/5 for RTW. 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 (progressing, not met)      (2) patient will demo FOTO impairment score 43% for improved QOL. 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  (progressing, not met)    Plan     Continue current POC with emphasis on improving functional strength and ROM.     Deisy Hay, SPT  Physical Therapy Student     I certify that I was present in the room directing the student in service delivery and guiding them using my skilled judgment. As the co-signing therapist I have reviewed the students documentation and am responsible for the treatment, assessment, and plan.      Edie Lerma, PT

## 2023-07-18 ENCOUNTER — CLINICAL SUPPORT (OUTPATIENT)
Dept: REHABILITATION | Facility: HOSPITAL | Age: 33
End: 2023-07-18
Payer: OTHER MISCELLANEOUS

## 2023-07-18 DIAGNOSIS — R68.89 DECREASED FUNCTIONAL ACTIVITY TOLERANCE: Primary | ICD-10-CM

## 2023-07-18 DIAGNOSIS — R29.898 WEAKNESS OF RIGHT LOWER EXTREMITY: ICD-10-CM

## 2023-07-18 PROCEDURE — 97112 NEUROMUSCULAR REEDUCATION: CPT | Mod: PO,CQ

## 2023-07-18 PROCEDURE — 97530 THERAPEUTIC ACTIVITIES: CPT | Mod: PO,CQ

## 2023-07-18 PROCEDURE — 97110 THERAPEUTIC EXERCISES: CPT | Mod: PO,CQ

## 2023-07-18 NOTE — PROGRESS NOTES
[facility]   Workers' Compensation Physical Therapy Treatment Note      Name: José Miguel Motta  Clinic Number: [id number]    Therapy Diagnosis:   Encounter Diagnoses   Name Primary?    Decreased functional activity tolerance Yes    Weakness of right lower extremity      Physician: Trace Candelaria MD    Visit Date: 7/18/2023    Physician Orders: PT Eval and Treat   Post Surgical? No Eval and Treat Yes Type of Therapy Outpatient Therapy   Medical Diagnosis from Referral: M25.561 (ICD-10-CM) - Pain in right knee   Evaluation Date: 5/18/2023  Authorization Period Expiration: 05/08/2024   Plan of Care Expiration: 7/7/2023, 8/10/2023   Progress Note Due: 6/17/2023, 7/29/2023  Visit # / Visits authorized: 16/18  PTA: 1/5    (# of No Show Appts 0/Number of Cancelled Appts 1)    FOTO: 4/5    Time In: 1312 (patient late)  Time Out: 1407  Total Appointment Time: 53 minutes    Precautions: Standard    Prior Medical Treatment: He underwent conservative therapy x 3.5 mo prior to knee and toe surgery. He saw very minimal relief.  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 Patient states his knee and foot are sore but he does admit that he feels better on the days he comes to therapy. He was compliant with home exercise program.  Response to previous treatment: mild soreness  Function: feels that ROM and ability has improved.     Pain: 3/10 knee (constant pain, sometimes  "goes up to a 5 like when it rains)   Location: R knee  Objective    Objective Measures updated at progress report unless specified.     Treatment     José Miguel received the treatments listed below:      therapeutic exercises to develop strength, endurance, ROM, flexibility, posture, and core stabilization for 25 minutes including:  Upright bike x 7 minutes, patient sets resistance  Standing gastroc stretch on incline x 1 minute x 2 (Gastroc and Soleus)  Quad set + heel lift 2x10, 5 sec hold (NP)  SLR (R) 3x10, 3# (NP)  S/L hip abduction 3x10, 3# (B) (NP)  SINGLE LEG bridge 2 x 10, 3"  Piriformis stretch 3 x 30 (B) (NP)    Seated precor: knee extension eccentric: 2 up, 1 down (R),  2x10, 20#    BLE shuttle squats 3x10, 100#  SL shuttle squats 2x10, 62.5#  Lateral walking (green TB at thighs) x 30 ft x 3 (straight knees, no mini squat due to pain)    Manual therapy x 00 mins:  Fat pad mobs, tibial IR (np)  Inferior patellar mobs grade III-IV (NP)     neuromuscular re-education activities to improve: Balance, Coordination, Proprioception, and Posture for 15 minutes. The following activities were included:  Standing TKE (ball against wall) 2x10, 3 sec hold   Clams with manual resistance 4x15s bilateral (np)   Quad Iso on knee ext machine 5x15s     therapeutic activities to improve functional performance for 10 minutes, including:  Sit to stand from standard chair + 25# med ball 3x8   Step up (12 inch step) + knee drive 20# 2 x 10   KB 3x8 (manual cues for hip abductor faciliation)     Patient Education and Home Exercises       Education provided:   - HEP compliance   - knuckle scar massage in sitting to knee  - standing with knees bent to avoid chronic genu recurvatum     Home Exercises Provided: Patient instructed to cont prior HEP. Exercises were reviewed and José Miguel was able to demonstrate them prior to the end of the session.  José Miguel demonstrated good  understanding of the education provided. See EMR under Patient " Instructions for exercises provided during therapy sessions    Assessment     José Miguel does demonstrate improvements in quad activation as session progresses with evident muscle juddering with onset of fatigue. No complaints of pain with squat or step up activities. Patient will be receiving injection in lumbar spine later this week with plans to finish remaining PT visits next week.     The patient's current job specific task deficits include the following: impaired standing tolerance and mobility.     José Miguel is making good progress towards meeting his goals.     Patient prognosis is: Good.   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    Pt's spiritual, cultural and educational needs considered and pt agreeable to plan of care and goals.     Anticipated barriers to physical therapy: chronicity of current injury, objective abilities vs. self-perceived abilities, and self-limiting behaviors due to pain    Goals:   Short Term Goals: 4 weeks   (1) patient will be independent with HOME EXERCISE PROGRAM.   -MET, 6/16/2023.    (2) patient will demo right knee AROM flexion to 135 deg for symmetry.    -MET, 6/16/2023, 137 DEG.     (3) patient will demo bilateral hip and knee strength 5/5 for RTW. 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 (progressing, not met)      (2) patient will demo FOTO impairment score 43% for improved QOL. 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 (progressing, not met)    Plan     Continue current POC with emphasis on improving functional strength and ROM.      Keeley Terrell, PTA

## 2023-07-24 ENCOUNTER — CLINICAL SUPPORT (OUTPATIENT)
Dept: REHABILITATION | Facility: HOSPITAL | Age: 33
End: 2023-07-24
Payer: OTHER MISCELLANEOUS

## 2023-07-24 DIAGNOSIS — R29.898 WEAKNESS OF RIGHT LOWER EXTREMITY: ICD-10-CM

## 2023-07-24 DIAGNOSIS — R68.89 DECREASED FUNCTIONAL ACTIVITY TOLERANCE: Primary | ICD-10-CM

## 2023-07-24 PROCEDURE — 97110 THERAPEUTIC EXERCISES: CPT | Mod: PO,CQ

## 2023-07-24 NOTE — PROGRESS NOTES
[facility]   Workers' Compensation Physical Therapy Treatment Note      Name: José Miguel Motta  Clinic Number: [id number]    Therapy Diagnosis:   Encounter Diagnoses   Name Primary?    Decreased functional activity tolerance Yes    Weakness of right lower extremity        Physician: Trace Candelaria MD    Visit Date: 7/24/2023    Physician Orders: PT Eval and Treat   Post Surgical? No Eval and Treat Yes Type of Therapy Outpatient Therapy   Medical Diagnosis from Referral: M25.561 (ICD-10-CM) - Pain in right knee   Evaluation Date: 5/18/2023  Authorization Period Expiration: 05/08/2024   Plan of Care Expiration: 7/7/2023, 8/10/2023   Progress Note Due: 6/17/2023, 7/29/2023  Visit # / Visits authorized: 17/18  PTA: 1/5    (# of No Show Appts 0/Number of Cancelled Appts 1)    FOTO: 4/5    Time In: 0912 (patient late)  Time Out: 1000 AM  Total Appointment Time: 30 minutes    Precautions: Standard    Prior Medical Treatment: He underwent conservative therapy x 3.5 mo prior to knee and toe surgery. He saw very minimal relief.  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 Patient can not lift more than 20#, no excessive bending, twisting, and no ROM extremes. Patient states that since this procedure his pain level through (R) LE has been increased. He states he is feeling this more into his  "hip and knee.   us treatment: mild soreness  Function: feels that ROM and ability has improved.     Pain: 7/10 knee (general, hip down to R knee)  Location: R knee  Objective    Objective Measures updated at progress report unless specified.     Treatment     José Miguel received the treatments listed below:      therapeutic exercises to develop strength, endurance, ROM, flexibility, posture, and core stabilization for 25 minutes including:  Upright bike x 7 minutes, patient sets resistance  Standing gastroc stretch on incline x 1 minute x 2 (Gastroc and Soleus)  LTR x 3 minutes x 2   Supine piriformis stretch 30 sec x 3 (B)   Supine hamstring stretch with strap 30 sec x 3 (B)   Knee fall out (blue TB) 2x10 (B)   Quad set + heel lift 2x10, 5 sec hold   SLR (R) 3x10, 3# (NP)  S/L hip abduction 3x10, 3# (B) (NP)  SINGLE LEG bridge 2 x 10, 3"    Seated precor: knee extension eccentric: 2 up, 1 down (R),  2x10, 20#    BLE shuttle squats 3x10, 100# (NP)  SL shuttle squats 2x10, 62.5# (NP)  Lateral walking (green TB at thighs) x 30 ft x 3 (straight knees, no mini squat due to pain) (NP)    Manual therapy x 00 mins:  Fat pad mobs, tibial IR (np)  Inferior patellar mobs grade III-IV (NP)     neuromuscular re-education activities to improve: Balance, Coordination, Proprioception, and Posture for 00 minutes. The following activities were included:  Standing TKE (ball against wall) 2x10, 3 sec hold   Clams with manual resistance 4x15s bilateral (np)   Quad Iso on knee ext machine 5x15s     therapeutic activities to improve functional performance for 00 minutes, including:  Sit to stand from standard chair + 25# med ball 3x8   Step up (12 inch step) + knee drive 20# 2 x 10   KB 3x8 (manual cues for hip abductor faciliation)     Patient Education and Home Exercises       Education provided:   - HEP compliance   - knuckle scar massage in sitting to knee  - standing with knees bent to avoid chronic genu recurvatum     Home Exercises " Provided: Patient instructed to cont prior [facility]. Exercises were reviewed and José Miguel was able to demonstrate them prior to the end of the session.  José Miguel demonstrated good  understanding of the education provided. See EMR under Patient Instructions for exercises provided during therapy sessions    Assessment     Modified treatment session due to patient's presentation. Patient with increased pain from (R) LE back into (R) LE since disc allograft procedure last week. Patient does have lifting and ROM restrictions in place due to this. Patient much more guarded due to with easily achieved therapeutic effect achieved with stretching focused exercises. Patient able to perform light strengthening exercise today without adverse effect. Patient has 1 remaining visit on current referral with plans to discharge to [facility] at that visit.     The patient's current job specific task deficits include the following: impaired standing tolerance and mobility.     José Miguel is making good progress towards meeting his goals.     Patient prognosis is: Good.   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    Pt's spiritual, cultural and educational needs considered and pt agreeable to plan of care and goals.     Anticipated barriers to physical therapy: chronicity of current injury, objective abilities vs. self-perceived abilities, and self-limiting behaviors due to pain    Goals:   Short Term Goals: 4 weeks   (1) patient will be independent with HOME EXERCISE PROGRAM.   -MET, 6/16/2023.    (2) patient will demo right knee AROM flexion to 135 deg for symmetry.    -MET, 6/16/2023, 137 DEG.     (3) patient will demo bilateral hip and  knee strength 5/5 for RTW. 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 (progressing, not met)      (2) patient will demo FOTO impairment score 43% for improved QOL. 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 (progressing, not met)    Plan     Continue current POC with emphasis on improving functional strength and ROM.     Keeley Terrell, PTA

## 2023-07-26 NOTE — PROGRESS NOTES
[facility]  Physical Therapy Discharge Note    Name: José Miguel Motta  Clinic Number: [id number]    Therapy Diagnosis:   Encounter Diagnoses   Name Primary?    Decreased functional activity tolerance Yes    Weakness of right lower extremity      Physician: Trace Candelaria MD    Physician Orders: Eval and treat right knee   Medical Diagnosis: Pain in right knee [M25.561]  Evaluation Date: 2023    Date of Last visit: 23  Total Visits Received: 18    ASSESSMENT      José Miguel has recently had a back procedure, because of this his back tends to limit his knee function. José Miguel was able to get 5/5 strength in B LE; however, with R knee flexion and extension he did experience some knee pain. José Miguel met all three of his short term goals and one of his three long term goals. José Miguel did state that he likely did not meet his FOTO knee impairment goal score due to his recent back procedure. José Miguel was given a printed HEP to take home and work on after discharge. José Miguel did state that once his back precautions  he is going to start working out with a friend that is a . Overall, José Miguel is still extremely limited by his ankle, hip, and back pain.     Discharge reason: Patient has reached the maximum rehab potential for the present time    Discharge FOTO Score: 54%    Goals:   Short Term Goals: 4 weeks   (1) patient will be independent with HOME EXERCISE PROGRAM.   -MET, 2023.    (2) patient will demo right knee AROM flexion to 135 deg for symmetry.    -MET, 2023, 137 DEG.     (3) patient will demo bilateral hip and knee strength 5/5 for RTW.    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     -MET, 2023  (2) patient will demo FOTO impairment score 43% for improved QOL. 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    -NOT MET    PLAN   This patient is discharged from Physical Therapy    [facility]   Workers' Compensation Physical Therapy Treatment Note      Name: José Miguel Motta  Clinic Number: [id number]    Therapy Diagnosis:   Encounter Diagnoses   Name Primary?    Decreased functional activity tolerance Yes    Weakness of right lower extremity          Physician: Trace Candelaria MD    Visit Date: 7/27/2023    Physician Orders: PT Eval and Treat   Post Surgical? No Eval and Treat Yes Type of Therapy Outpatient Therapy   Medical Diagnosis from Referral: M25.561 (ICD-10-CM) - Pain in right knee   Evaluation Date: 5/18/2023  Authorization Period Expiration: 05/08/2024   Plan of Care Expiration: 7/7/2023, 8/10/2023   Progress Note Due: 6/17/2023, 7/29/2023  Visit # / Visits authorized: 18/18  PTA: 0/5    (# of No Show Appts 0/Number of Cancelled Appts 4)    FOTO: 5/5    Time In: 1304  Time Out: 1402  Total Appointment Time: 43 minutes    Precautions: Standard    Prior Medical Treatment: He underwent conservative therapy x 3.5 mo prior to knee and toe surgery. He saw very minimal relief.  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 Patient can not lift more than 20#, no excessive bending, twisting, and no ROM extremes. Patient states that since this procedure his pain level through (R)  LE has been increased. He states he is feeling this more into his hip and knee.   us treatment: mild soreness  Function: feels that ROM and ability has improved.     Pain: 4/10 knee (general, hip down to R knee)  Location: R knee  Objective    Objective Measures updated at progress report unless specified.     Update:   Observations: Antalgic gait pattern is not present. However, decreased right knee TKE at heel strike is present. (QUAD WEAKNESS LIKELY GIVEN FULL KNEE EXTENSION)    Palpation: Pt is now only tender to medial joint line no longer lateral joint line.      Knee Right     Left     Comment as applicable    (Normal, in degrees) AROM PROM MMT AROM PROM MMT     Flexion (135)  134 142+ 4/5 135 - 5/5 + pn w/ over pressure flex   Extension (0-5) 0 - 4+/5 0 - 5/5        Ankle strength: bilateral grossly 5/5.     Hip strength:  Right-tested/Left not tested:  Hip flexion: 5/5; 5/5  Hip Abduction: 5/5; 5/5  Hip adduction: 5/5; 5/5  Hip ext: 5/5, 5/5     Ankle strength: (right)  DF: 5/5  PF: 5/5     Muscle length ((+): Positive, (-): Negative):   - Hamstring 90/90 = (-)  - ITB Geoffrey's testing = (+) (lateral patella tilt bilateral knee)     Meniscus testing: Cluster exam for meniscal injury: Met 2/5  - (neg) Hx joint locking, (+) Medial Joint line tenderness, (-) Paul, (+) Pain with overpressure flexion, (-) Pain with overpressure extension:     Functional Job Specific Testing: [employer]  Job Specific Task Job Demands Current Ability   1. 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 Mobility skills Sweeping, walking 45 mins around home       Per pt report: Pt reports that he can walk a mile but needs to stop d/t R foot and knee pain.     Limitation/Restriction for FOTO KNEE Survey     Therapist reviewed FOTO scores   FOTO documents entered into [employer] - see Media section.     Eval Limitation Score: 59%  Predicted Score: 43%  6/29/2023: 51%   7/27/2023: 54%     Treatment     José Miguel received the  "treatments listed below:      therapeutic exercises to develop strength, endurance, ROM, flexibility, posture, and core stabilization for 35 minutes including:  Upright bike x 7 minutes, patient sets resistance  Standing gastroc stretch on incline x 1 minute x 2 (Gastroc and Soleus)  LTR x 3 minutes x 2   Supine piriformis stretch 30 sec x 3 (B)   Supine hamstring stretch with strap 30 sec x 3 (B)   Knee fall out (blue TB) 2x10 (B)   Quad set + heel lift 2x10, 5 sec hold   Reassessment, d/c, and HEP review and discussion in regards to back safety given back restriction.    Not performed today:  SINGLE LEG bridge 2 x 10, 3"   SLR (R) 3x10, 3#   S/L hip abduction 3x10, 3# (B)   Seated precor: knee extension eccentric: 2 up, 1 down (R),  2x10, 20#    BLE shuttle squats 3x10, 100#   SL shuttle squats 2x10, 62.5#   Lateral walking (green TB at thighs) x 30 ft x 3 (straight knees, no mini squat due to pain)     neuromuscular re-education activities to improve: Balance, Coordination, Proprioception, and Posture for 08 minutes. The following activities were included:  Standing TKE (ball against wall) 2x10, 3 sec hold   Clams with manual resistance 4x15s bilateral (np)   Quad Iso on knee ext machine 5x15s     therapeutic activities to improve functional performance for 00 minutes, including:  Sit to stand from standard chair + 25# med ball 3x8   Step up (12 inch step) + knee drive 20# 2 x 10   KB 3x8 (manual cues for hip abductor faciliation)     Patient Education and Home Exercises       Education provided:   - to continue HEP after discharge as well as to self progress.     Home Exercises Provided: Patient instructed to cont prior HEP. Exercises were reviewed and José Miguel was able to demonstrate them prior to the end of the session.  José Miguel demonstrated good  understanding of the education provided. See EMR under Patient Instructions for exercises provided during therapy sessions    Assessment     José Miguel's session was " once again modified d/t his recent back procedure.  Patient presented with increased pain from (R) LE back into (R) LE since disc allograft procedure last week. Patient does have lifting and ROM restrictions in place due to this. Pt. Was able to perform most of his exercises today and certain ones were not performed d/t back precautions. José Miguel was able to do most of his lying exercises and some of his seated exercises. He is going to continue doing his HEP and once his back precautions are lifted he stated that he is going to begin working out with a friend that is a . MD office told pt to finish out therapy appts approved and d/c.    The patient's current job specific task deficits include the following: impaired standing tolerance and mobility.     José Miguel is making good progress towards meeting his goals.     Patient prognosis is: Good.   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    Pt's spiritual, cultural and educational needs considered and pt agreeable to plan of care and goals.     Anticipated barriers to physical therapy: chronicity of current injury, objective abilities vs. self-perceived abilities, and self-limiting behaviors due to pain    Goals:   Short Term Goals: 4 weeks   (1) patient will be independent with HOME EXERCISE PROGRAM.   -MET, 6/16/2023.    (2) patient will demo right knee AROM flexion to 135 deg for symmetry.    -MET, 6/16/2023, 137 DEG.     (3) patient will demo bilateral hip and knee strength 5/5 for RTW.    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     -MET, 7/27/2023  (2)  patient will demo FOTO impairment score 43% for improved QOL. 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    -NOT MET    Plan     Continue current POC with emphasis on improving functional strength and ROM.     Deisy Hay, [facility]  Physical Therapy Student     I certify that I was present in the room directing the student in service delivery and guiding them using my skilled judgment. As the co-signing therapist I have reviewed the students documentation and am responsible for the treatment, assessment, and plan.

## 2023-07-27 ENCOUNTER — CLINICAL SUPPORT (OUTPATIENT)
Dept: REHABILITATION | Facility: HOSPITAL | Age: 33
End: 2023-07-27
Payer: OTHER MISCELLANEOUS

## 2023-07-27 DIAGNOSIS — R29.898 WEAKNESS OF RIGHT LOWER EXTREMITY: ICD-10-CM

## 2023-07-27 DIAGNOSIS — R68.89 DECREASED FUNCTIONAL ACTIVITY TOLERANCE: Primary | ICD-10-CM

## 2023-07-27 PROCEDURE — 97112 NEUROMUSCULAR REEDUCATION: CPT | Mod: PO | Performed by: PHYSICAL THERAPIST

## 2023-07-27 PROCEDURE — 97110 THERAPEUTIC EXERCISES: CPT | Mod: PO | Performed by: PHYSICAL THERAPIST

## 2023-07-27 NOTE — PLAN OF CARE
[facility]  Physical Therapy Discharge Note     Name: José Miguel Motta  Clinic Number: [id number]     Therapy Diagnosis:        Encounter Diagnoses   Name Primary?    Decreased functional activity tolerance Yes    Weakness of right lower extremity        Physician: Trace Candelaria MD     Physician Orders: Eval and treat right knee   Medical Diagnosis: Pain in right knee [M25.561]  Evaluation Date: 2023     Date of Last visit: 23  Total Visits Received: 18     ASSESSMENT       José Miguel has recently had a back procedure, because of this his back tends to limit his knee function. José Miguel was able to get 5/5 strength in B LE; however, with R knee flexion and extension he did experience some knee pain. José Miguel met all three of his short term goals and one of his three long term goals. José Miguel did state that he likely did not meet his FOTO knee impairment goal score due to his recent back procedure. José Miguel was given a printed HEP to take home and work on after discharge. José Miguel did state that once his back precautions  he is going to start working out with a friend that is a . Overall, José Miguel is still extremely limited by his ankle, hip, and back pain.      Discharge reason: Patient has reached the maximum rehab potential for the present time     Discharge FOTO Score: 54%     Goals:   Short Term Goals: 4 weeks   (1) patient will be independent with HOME EXERCISE PROGRAM.              -MET, 2023.     (2) patient will demo right knee AROM flexion to 135 deg for symmetry.               -MET, 2023, 137 DEG.     (3) patient will demo bilateral hip and knee strength 5/5 for RTW.               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                -MET, 2023  (2) patient will demo FOTO impairment score 43% for improved QOL. 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               -NOT MET     PLAN   This patient is discharged from Physical Therapy     [facility]   Workers' Compensation Physical Therapy Treatment Note       Name: José Miguel Motta  [facility] Number: [id number]     Therapy Diagnosis:        Encounter Diagnoses   Name Primary?    Decreased functional activity tolerance Yes    Weakness of right lower extremity              Physician: Trace Candelaria MD     Visit Date: 7/27/2023     Physician Orders: PT Eval and Treat   Post Surgical? No Eval and Treat Yes Type of Therapy Outpatient Therapy   Medical Diagnosis from Referral: M25.561 (ICD-10-CM) - Pain in right knee   Evaluation Date: 5/18/2023  Authorization Period Expiration: 05/08/2024   Plan of Care Expiration: 7/7/2023, 8/10/2023   Progress Note Due: 6/17/2023, 7/29/2023  Visit # / Visits authorized: 18/18  PTA: 0/5     (# of No Show Appts 0/Number of Cancelled Appts 4)     FOTO: 5/5     Time In: 1304  Time Out: 1402  Total Appointment Time: 43 minutes     Precautions: Standard     Prior Medical Treatment: He underwent conservative therapy x 3.5 mo prior to knee and toe surgery. He saw very minimal relief.  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 Patient can not lift more than 20#, no excessive  bending, twisting, and no ROM extremes. Patient states that since this procedure his pain level through (R) LE has been increased. He states he is feeling this more into his hip and knee.   us treatment: mild soreness  Function: feels that ROM and ability has improved.      Pain: 4/10 knee (general, hip down to R knee)  Location: R knee  Objective    Objective Measures updated at progress report unless specified.      Update:   Observations: Antalgic gait pattern is not present. However, decreased right knee TKE at heel strike is present. (QUAD WEAKNESS LIKELY GIVEN FULL KNEE EXTENSION)     Palpation: Pt is now only tender to medial joint line no longer lateral joint line.      Knee Right     Left     Comment as applicable    (Normal, in degrees) AROM PROM MMT AROM PROM MMT     Flexion (135)  134 142+ 4/5 135 - 5/5 + pn w/ over pressure flex   Extension (0-5) 0 - 4+/5 0 - 5/5        Ankle strength: bilateral grossly 5/5.     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 Mobility skills Sweeping, walking 45 mins around home       Per pt report: Pt reports that he can walk a mile but needs to stop d/t R foot and knee pain.     Limitation/Restriction for FOTO KNEE Survey     Therapist reviewed FOTO scores   FOTO documents entered into [employer] - see Media section.     Eval Limitation  "Score: 59%  Predicted Score: 43%  6/29/2023: 51%   7/27/2023: 54%      Treatment      José Miguel received the treatments listed below:       therapeutic exercises to develop strength, endurance, ROM, flexibility, posture, and core stabilization for 35 minutes including:  Upright bike x 7 minutes, patient sets resistance  Standing gastroc stretch on incline x 1 minute x 2 (Gastroc and Soleus)  LTR x 3 minutes x 2   Supine piriformis stretch 30 sec x 3 (B)   Supine hamstring stretch with strap 30 sec x 3 (B)   Knee fall out (blue TB) 2x10 (B)   Quad set + heel lift 2x10, 5 sec hold   Reassessment, d/c, and HEP review and discussion in regards to back safety given back restriction.     Not performed today:  SINGLE LEG bridge 2 x 10, 3"   SLR (R) 3x10, 3#   S/L hip abduction 3x10, 3# (B)   Seated precor: knee extension eccentric: 2 up, 1 down (R),  2x10, 20#    BLE shuttle squats 3x10, 100#   SL shuttle squats 2x10, 62.5#   Lateral walking (green TB at thighs) x 30 ft x 3 (straight knees, no mini squat due to pain)      neuromuscular re-education activities to improve: Balance, Coordination, Proprioception, and Posture for 08 minutes. The following activities were included:  Standing TKE (ball against wall) 2x10, 3 sec hold   Clams with manual resistance 4x15s bilateral (np)   Quad Iso on knee ext machine 5x15s      therapeutic activities to improve functional performance for 00 minutes, including:  Sit to stand from standard chair + 25# med ball 3x8   Step up (12 inch step) + knee drive 20# 2 x 10   KB 3x8 (manual cues for hip abductor faciliation)      Patient Education and Home Exercises        Education provided:   - to continue HEP after discharge as well as to self progress.      Home Exercises Provided: Patient instructed to cont prior HEP. Exercises were reviewed and José Miguel was able to demonstrate them prior to the end of the session.  José Miguel demonstrated good  understanding of the education provided. See EMR " under Patient Instructions for exercises provided during therapy sessions     Assessment      José Miguel's session was once again modified d/t his recent back procedure.  Patient presented with increased pain from (R) LE back into (R) LE since disc allograft procedure last week. Patient does have lifting and ROM restrictions in place due to this. Pt. Was able to perform most of his exercises today and certain ones were not performed d/t back precautions. José Miguel was able to do most of his lying exercises and some of his seated exercises. He is going to continue doing his HEP and once his back precautions are lifted he stated that he is going to begin working out with a friend that is a . MD office told pt to finish out therapy appts approved and d/c.     The patient's current job specific task deficits include the following: impaired standing tolerance and mobility.      José Miguel is making good progress towards meeting his goals.      Patient prognosis is: Good.   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     Pt's spiritual, cultural and educational needs considered and pt agreeable to plan of care and goals.     Anticipated barriers to physical therapy: chronicity of current injury, objective abilities vs. self-perceived abilities, and self-limiting behaviors due to pain     Goals:   Short Term Goals: 4 weeks   (1) patient will be independent with HOME EXERCISE PROGRAM.              -MET, 6/16/2023.     (2) patient will demo right knee AROM flexion to 135 deg for symmetry.               -MET, 6/16/2023, 137 DEG.     (3) patient will demo bilateral hip and knee strength 5/5 for RTW.                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                -MET, 7/27/2023  (2) patient will demo FOTO impairment score 43% for improved QOL. 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               -NOT MET     Plan      Continue current POC with emphasis on improving functional strength and ROM.      Deisy Hay, [facility]  Physical Therapy Student      I certify that I was present in the room directing the student in service delivery and guiding them using my skilled judgment. As the co-signing therapist I have reviewed the students documentation and am responsible for the treatment, assessment, and plan.

## 2023-07-27 NOTE — PLAN OF CARE
[facility]  Physical Therapy Discharge Note    Name: José Miguel Motta  Clinic Number: [id number]    Therapy Diagnosis: No diagnosis found.  Physician: Trace Candelaria MD    Physician Orders: Eval and treat right knee   Medical Diagnosis: Pain in right knee [M25.561]  Evaluation Date: 2023      Date of Last visit: 23  Total Visits Received: 18    ASSESSMENT      José Miguel has recently had a back procedure, because of this his back tends to limit his knee function. José Miguel was able to get 5/5 strength in B LE; however, with R knee flexion and extension he did experience some knee pain. José Miguel met all three of his short term goals and one of his three long term goals. José Miguel did state that he likely did not meet his FOTO knee impairment goal score due to his recent back procedure. José Miguel was given a printed HEP to take home and work on after discharge. José Miguel did state that once his back precautions  he is going to start working out with a friend that is a . Overall, José Miguel is still extremely limited by his ankle, hip, and back pain.     Discharge reason: Patient has reached the maximum rehab potential for the present time    Discharge FOTO Score: 54%    Goals:   Short Term Goals: 4 weeks   (1) patient will be independent with HOME EXERCISE PROGRAM.   -MET, 2023.    (2) patient will demo right knee AROM flexion to 135 deg for symmetry.    -MET, 2023, 137 DEG.     (3) patient will demo bilateral hip and knee strength 5/5 for RTW.    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     -MET, 2023  (2) patient will demo FOTO impairment score 43% for improved QOL. 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    -NOT MET    PLAN   This patient is discharged from Physical Therapy    MILI Rizzo     Physical Therapy Student     I certify that I was present in the room directing the student in service delivery and guiding them using my skilled judgment. As the co-signing therapist I have reviewed the students documentation and am responsible for the treatment, assessment, and plan.      [facility]   Workers' Compensation Physical Therapy Treatment Note      Name: José Miguel Motta  [facility] Number: [id number]    Therapy Diagnosis:   No diagnosis found.      Physician: Trace Candelaria MD    Visit Date: 7/27/2023    Physician Orders: PT Eval and Treat   Post Surgical? No Eval and Treat Yes Type of Therapy Outpatient Therapy   Medical Diagnosis from Referral: M25.561 (ICD-10-CM) - Pain in right knee   Evaluation Date: 5/18/2023  Authorization Period Expiration: 05/08/2024   Plan of Care Expiration: 7/7/2023, 8/10/2023   Progress Note Due: 6/17/2023, 7/29/2023  Visit # / Visits authorized: 18/18  PTA: 0/5    (# of No Show Appts 0/Number of Cancelled Appts 1)    FOTO: 5/5    Time In: 1304  Time Out: 1402  Total Appointment Time: 55 minutes    Precautions: Standard    Prior Medical Treatment: He underwent conservative therapy x 3.5 mo prior to knee and toe surgery. He saw very minimal relief.  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 Patient can not lift more than 20#, no  excessive bending, twisting, and no ROM extremes. Patient states that since this procedure his pain level through (R) LE has been increased. He states he is feeling this more into his hip and knee.   us treatment: mild soreness  Function: feels that ROM and ability has improved.     Pain: 4/10 knee (general, hip down to R knee)  Location: R knee  Objective    Objective Measures updated at progress report unless specified.     Update:   Observations: Antalgic gait pattern is not present. However, decreased right knee TKE at heel strike is present. (QUAD WEAKNESS LIKELY GIVEN FULL KNEE EXTENSION)    Palpation: Pt is now only tender to medial joint line no longer lateral joint line.      Knee Right     Left     Comment as applicable    (Normal, in degrees) AROM PROM MMT AROM PROM MMT     Flexion (135)  134 142+ 4/5 135 - 5/5 + pn w/ over pressure flex   Extension (0-5) 0 - 4+/5 0 - 5/5        Ankle strength: bilateral grossly 5/5.     Hip strength:  Right-tested/Left not tested:  Hip flexion: 5/5; 5/5  Hip Abduction: 5/5; 5/5  Hip adduction: 5/5; 5/5  Hip ext: 5/5, 5/5     Ankle strength: (right)  DF: 5/5  PF: 5/5     Muscle length ((+): Positive, (-): Negative):   - Hamstring 90/90 = (-)  - ITB Goeffrey's testing = (+) (lateral patella tilt bilateral knee)     Meniscus testing: Cluster exam for meniscal injury: Met 2/5  - (neg) Hx joint locking, (+) Medial Joint line tenderness, (-) Paul, (+) Pain with overpressure flexion, (-) Pain with overpressure extension:     Functional Job Specific Testing: [employer]  Job Specific Task Job Demands Current Ability   1. 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 Mobility skills Sweeping, walking 45 mins around home       Per pt report: Pt reports that he can walk a mile but needs to stop d/t R foot and knee pain.     Limitation/Restriction for FOTO KNEE Survey     Therapist reviewed FOTO scores   FOTO documents entered into [employer] - see Media section.     Shabbir  "Limitation Score: 59%  Predicted Score: 43%  6/29/2023: 51%   7/27/2023: 54%     Treatment     José Miguel received the treatments listed below:      therapeutic exercises to develop strength, endurance, ROM, flexibility, posture, and core stabilization for 25 minutes including:  Upright bike x 7 minutes, patient sets resistance  Standing gastroc stretch on incline x 1 minute x 2 (Gastroc and Soleus)  LTR x 3 minutes x 2   Supine piriformis stretch 30 sec x 3 (B)   Supine hamstring stretch with strap 30 sec x 3 (B)   Knee fall out (blue TB) 2x10 (B)   Quad set + heel lift 2x10, 5 sec hold     Not performed today:  SINGLE LEG bridge 2 x 10, 3"   SLR (R) 3x10, 3#   S/L hip abduction 3x10, 3# (B)   Seated precor: knee extension eccentric: 2 up, 1 down (R),  2x10, 20#    BLE shuttle squats 3x10, 100#   SL shuttle squats 2x10, 62.5#   Lateral walking (green TB at thighs) x 30 ft x 3 (straight knees, no mini squat due to pain)     neuromuscular re-education activities to improve: Balance, Coordination, Proprioception, and Posture for 08 minutes. The following activities were included:  Standing TKE (ball against wall) 2x10, 3 sec hold   Clams with manual resistance 4x15s bilateral (np)   Quad Iso on knee ext machine 5x15s     therapeutic activities to improve functional performance for 00 minutes, including:  Sit to stand from standard chair + 25# med ball 3x8   Step up (12 inch step) + knee drive 20# 2 x 10   KB 3x8 (manual cues for hip abductor faciliation)     Patient Education and Home Exercises       Education provided:   - to continue HEP after discharge as well as to self progress.     Home Exercises Provided: Patient instructed to cont prior HEP. Exercises were reviewed and José Miguel was able to demonstrate them prior to the end of the session.  José Miguel demonstrated good  understanding of the education provided. See EMR under Patient Instructions for exercises provided during therapy sessions    Assessment "     José Miguel's session was once again modified d/t his recent back procedure.  Patient presented with increased pain from (R) LE back into (R) LE since disc allograft procedure last week. Patient does have lifting and ROM restrictions in place due to this. Pt. Was able to perform most of his exercises today and certain ones were not performed d/t back precautions. José Miguel was able to do most of his lying exercises and some of his seated exercises. He is going to continue doing his HEP and once his back precautions are lifted he stated that he is going to begin working out with a friend that is a .     The patient's current job specific task deficits include the following: impaired standing tolerance and mobility.     José Miguel is making good progress towards meeting his goals.     Patient prognosis is: Good.   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    Pt's spiritual, cultural and educational needs considered and pt agreeable to plan of care and goals.     Anticipated barriers to physical therapy: chronicity of current injury, objective abilities vs. self-perceived abilities, and self-limiting behaviors due to pain    Goals:   Short Term Goals: 4 weeks   (1) patient will be independent with HOME EXERCISE PROGRAM.   -MET, 6/16/2023.    (2) patient will demo right knee AROM flexion to 135 deg for symmetry.    -MET, 6/16/2023, 137 DEG.     (3) patient will demo bilateral hip and knee strength 5/5 for RTW.    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     -MET, 7/27/2023  (2) patient will demo FOTO impairment  score 43% for improved QOL. 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    -NOT MET    Plan     Continue current POC with emphasis on improving functional strength and ROM.     Deisy Hay, [facility]  Physical Therapy Student     I certify that I was present in the room directing the student in service delivery and guiding them using my skilled judgment. As the co-signing therapist I have reviewed the students documentation and am responsible for the treatment, assessment, and plan.